# Patient Record
Sex: FEMALE | Race: WHITE | Employment: UNEMPLOYED | URBAN - METROPOLITAN AREA
[De-identification: names, ages, dates, MRNs, and addresses within clinical notes are randomized per-mention and may not be internally consistent; named-entity substitution may affect disease eponyms.]

---

## 2018-01-26 ENCOUNTER — OFFICE VISIT (OUTPATIENT)
Dept: FAMILY MEDICINE CLINIC | Age: 48
End: 2018-01-26
Payer: MEDICARE

## 2018-01-26 ENCOUNTER — HOSPITAL ENCOUNTER (OUTPATIENT)
Age: 48
Setting detail: SPECIMEN
Discharge: HOME | End: 2018-01-26
Payer: MEDICARE

## 2018-01-26 VITALS
BODY MASS INDEX: 30.98 KG/M2 | HEART RATE: 90 BPM | WEIGHT: 204.4 LBS | RESPIRATION RATE: 16 BRPM | DIASTOLIC BLOOD PRESSURE: 94 MMHG | SYSTOLIC BLOOD PRESSURE: 141 MMHG | OXYGEN SATURATION: 98 % | HEIGHT: 68 IN

## 2018-01-26 DIAGNOSIS — M54.42 CHRONIC LEFT-SIDED LOW BACK PAIN WITH LEFT-SIDED SCIATICA: ICD-10-CM

## 2018-01-26 DIAGNOSIS — G47.33 OSA (OBSTRUCTIVE SLEEP APNEA): ICD-10-CM

## 2018-01-26 DIAGNOSIS — Z23 NEEDS FLU SHOT: ICD-10-CM

## 2018-01-26 DIAGNOSIS — M79.7 FIBROMYALGIA: ICD-10-CM

## 2018-01-26 DIAGNOSIS — B49 FUNGAL INFECTION: ICD-10-CM

## 2018-01-26 DIAGNOSIS — G89.29 CHRONIC LEFT-SIDED LOW BACK PAIN WITH LEFT-SIDED SCIATICA: ICD-10-CM

## 2018-01-26 DIAGNOSIS — R35.0 INCREASED FREQUENCY OF URINATION: ICD-10-CM

## 2018-01-26 DIAGNOSIS — F32.A ANXIETY AND DEPRESSION: ICD-10-CM

## 2018-01-26 DIAGNOSIS — J45.20 MILD INTERMITTENT ASTHMA WITHOUT COMPLICATION: ICD-10-CM

## 2018-01-26 DIAGNOSIS — K58.0 IRRITABLE BOWEL SYNDROME WITH DIARRHEA: ICD-10-CM

## 2018-01-26 DIAGNOSIS — F41.9 ANXIETY AND DEPRESSION: ICD-10-CM

## 2018-01-26 DIAGNOSIS — K21.9 GASTROESOPHAGEAL REFLUX DISEASE WITHOUT ESOPHAGITIS: ICD-10-CM

## 2018-01-26 DIAGNOSIS — R41.3 MEMORY DEFICIT: ICD-10-CM

## 2018-01-26 DIAGNOSIS — I10 ESSENTIAL HYPERTENSION: ICD-10-CM

## 2018-01-26 DIAGNOSIS — Z76.89 ENCOUNTER TO ESTABLISH CARE: Primary | ICD-10-CM

## 2018-01-26 PROBLEM — K58.1 IRRITABLE BOWEL SYNDROME WITH CONSTIPATION: Status: RESOLVED | Noted: 2018-01-26 | Resolved: 2018-01-26

## 2018-01-26 PROBLEM — K58.1 IRRITABLE BOWEL SYNDROME WITH CONSTIPATION: Status: ACTIVE | Noted: 2018-01-26

## 2018-01-26 LAB
BILIRUBIN URINE: NEGATIVE
COLOR: YELLOW
COMMENT UA: NORMAL
GLUCOSE URINE: NEGATIVE
KETONES, URINE: NEGATIVE
LEUKOCYTE ESTERASE, URINE: NEGATIVE
NITRITE, URINE: NEGATIVE
PH UA: 7 (ref 5–8)
PROTEIN UA: NEGATIVE
SPECIFIC GRAVITY UA: 1.02 (ref 1–1.03)
TURBIDITY: CLEAR
URINE HGB: NEGATIVE
UROBILINOGEN, URINE: NORMAL

## 2018-01-26 PROCEDURE — G8427 DOCREV CUR MEDS BY ELIG CLIN: HCPCS | Performed by: FAMILY MEDICINE

## 2018-01-26 PROCEDURE — 1036F TOBACCO NON-USER: CPT | Performed by: FAMILY MEDICINE

## 2018-01-26 PROCEDURE — 99204 OFFICE O/P NEW MOD 45 MIN: CPT | Performed by: FAMILY MEDICINE

## 2018-01-26 PROCEDURE — G8484 FLU IMMUNIZE NO ADMIN: HCPCS | Performed by: FAMILY MEDICINE

## 2018-01-26 PROCEDURE — 90471 IMMUNIZATION ADMIN: CPT | Performed by: FAMILY MEDICINE

## 2018-01-26 PROCEDURE — G8417 CALC BMI ABV UP PARAM F/U: HCPCS | Performed by: FAMILY MEDICINE

## 2018-01-26 PROCEDURE — 90686 IIV4 VACC NO PRSV 0.5 ML IM: CPT | Performed by: FAMILY MEDICINE

## 2018-01-26 RX ORDER — LEVONORGESTREL AND ETHINYL ESTRADIOL 100-20(84)
1 KIT ORAL
COMMUNITY
Start: 2017-12-22 | End: 2019-02-25

## 2018-01-26 RX ORDER — CHLORTHALIDONE 25 MG/1
25 TABLET ORAL DAILY
Qty: 30 TABLET | Refills: 3 | Status: SHIPPED | OUTPATIENT
Start: 2018-01-26 | End: 2018-02-15 | Stop reason: ALTCHOICE

## 2018-01-26 RX ORDER — OMEPRAZOLE 20 MG/1
20 CAPSULE, DELAYED RELEASE ORAL DAILY
Qty: 90 CAPSULE | Refills: 0 | Status: SHIPPED | OUTPATIENT
Start: 2018-01-26 | End: 2019-02-25

## 2018-01-26 RX ORDER — BUSPIRONE HYDROCHLORIDE 5 MG/1
TABLET ORAL
Refills: 0 | COMMUNITY
Start: 2018-01-15 | End: 2018-08-01 | Stop reason: ALTCHOICE

## 2018-01-26 RX ORDER — LORAZEPAM 1 MG/1
1 TABLET ORAL 2 TIMES DAILY
Qty: 2 TABLET | Refills: 0 | Status: SHIPPED | OUTPATIENT
Start: 2018-01-26 | End: 2018-01-27

## 2018-01-26 RX ORDER — CLOTRIMAZOLE AND BETAMETHASONE DIPROPIONATE 10; .64 MG/G; MG/G
CREAM TOPICAL
Qty: 15 G | Refills: 0 | Status: SHIPPED | OUTPATIENT
Start: 2018-01-26 | End: 2018-08-01 | Stop reason: ALTCHOICE

## 2018-01-26 ASSESSMENT — PATIENT HEALTH QUESTIONNAIRE - PHQ9
2. FEELING DOWN, DEPRESSED OR HOPELESS: 0
SUM OF ALL RESPONSES TO PHQ9 QUESTIONS 1 & 2: 1
1. LITTLE INTEREST OR PLEASURE IN DOING THINGS: 1
SUM OF ALL RESPONSES TO PHQ QUESTIONS 1-9: 1

## 2018-01-26 ASSESSMENT — ENCOUNTER SYMPTOMS
DIARRHEA: 1
BACK PAIN: 1
SHORTNESS OF BREATH: 0
EYE PAIN: 0
BLOOD IN STOOL: 0

## 2018-01-26 NOTE — PROGRESS NOTES
months,  was seen urology in the past, denies hematuria, dysuria, urgency, or sensation of incomplete bladder emptying    Lasts labs May 2017 with rheum, CBC CMP wnl; lipids Aug 2016 wnl    Last pap - May 2017, normal  Contraception - OCP for hormonal migraines  Mammogram - 2018 with gyn, told it was normal    Patient Active Problem List   Diagnosis    Anxiety and depression    Fibromyalgia    Essential hypertension    Mild intermittent asthma without complication    Memory deficit    Gastroesophageal reflux disease without esophagitis    MARIBEL (obstructive sleep apnea)    Irritable bowel syndrome with diarrhea    Chronic left-sided low back pain with left-sided sciatica       Past Medical History:   Diagnosis Date    Anxiety     Depression     Fibromyalgia      Past Surgical History:   Procedure Laterality Date     SECTION      CHOLECYSTECTOMY      COCCYX REMOVAL       Family History   Problem Relation Age of Onset    High Blood Pressure Mother     Diabetes Father     Breast Cancer Maternal Grandmother      Social History   Substance Use Topics    Smoking status: Never Smoker    Smokeless tobacco: Never Used    Alcohol use Yes      Comment: occasional       Current Outpatient Prescriptions:     Levonorgest-Eth Estrad -Day 0.1-0.02 & 0.01 MG TABS, Take 1 tablet by mouth, Disp: , Rfl:     chlorthalidone (HYGROTON) 25 MG tablet, Take 1 tablet by mouth daily, Disp: 30 tablet, Rfl: 3    omeprazole (PRILOSEC) 20 MG delayed release capsule, Take 1 capsule by mouth daily, Disp: 90 capsule, Rfl: 0    clotrimazole-betamethasone (LOTRISONE) 1-0.05 % cream, Apply topically 2 times daily. , Disp: 15 g, Rfl: 0    LORazepam (ATIVAN) 1 MG tablet, Take 1 tablet by mouth 2 times daily for 1 day., Disp: 2 tablet, Rfl: 0    busPIRone (BUSPAR) 5 MG tablet, take 1 tablet by mouth twice a day, Disp: , Rfl: 0    Subjective:     Review of Systems   Constitutional: Negative for appetite change, diaphoresis, fever and unexpected weight change. HENT: Negative for ear pain. Eyes: Negative for pain. Respiratory: Negative for shortness of breath. Cardiovascular: Negative for chest pain and leg swelling. Gastrointestinal: Positive for diarrhea. Negative for blood in stool. Genitourinary: Positive for frequency. Negative for flank pain, hematuria, urgency and vaginal discharge. Musculoskeletal: Positive for back pain. Skin: Positive for rash. Psychiatric/Behavioral: Positive for dysphoric mood. Negative for suicidal ideas. The patient is nervous/anxious. Objective:     BP (!) 141/94   Pulse 90   Resp 16   Ht 5' 7.5\" (1.715 m)   Wt 204 lb 6.4 oz (92.7 kg)   SpO2 98%   BMI 31.54 kg/m²     Physical Exam   Constitutional: She is oriented to person, place, and time. She appears well-developed. No distress. HENT:   Head: Normocephalic. Mouth/Throat: Oropharynx is clear and moist.   Eyes: Conjunctivae and EOM are normal.   Neck: Normal range of motion. Neck supple. No thyromegaly present. Cardiovascular: Normal rate, regular rhythm and normal heart sounds. No murmur heard. Pulmonary/Chest: Effort normal and breath sounds normal. No respiratory distress. She has no wheezes. Abdominal: Soft. There is no rebound and no guarding. Musculoskeletal: She exhibits no edema or deformity. No pain on palpation over her spinous process, pain noted on palpation over the left paraspinal muscles, able to walk without a limp, sensation intact in lower extremities   Lymphadenopathy:     She has no cervical adenopathy. Neurological: She is alert and oriented to person, place, and time. Skin: Skin is warm. No rash noted. She is not diaphoretic. 2 cm circular pink rough rash consistent with tinea   Psychiatric: She has a normal mood and affect. Her behavior is normal. Judgment and thought content normal.       Assessment & Plan:      1.  Encounter to establish care  - Vitamin B12; Future  - Vitamin D 25 Hydroxy; Future  - TSH with Reflex; Future  - Lipid Panel; Future  - Comprehensive Metabolic Panel; Future  - CBC Auto Differential; Future    2. Anxiety and depression  Continue follow-up at [facility] behavioral.    3. Fibromyalgia  The patient wishes to go back to see Dr. Staci Zimmer who she had seen in the past.  - [facility] Zohra Genao MD    4. Essential hypertension  Rx given for chlorthalidone. She'll follow-up in one month for blood pressure check and she'll maintain a blood pressure log at home  - chlorthalidone (HYGROTON) 25 MG tablet; Take 1 tablet by mouth daily  Dispense: 30 tablet; Refill: 3    5. Mild intermittent asthma without complication    6. Memory deficit  Referral made for neuropsychological assessment. - Amb External Referral To Neuropsychology    7. Gastroesophageal reflux disease without esophagitis  - omeprazole (PRILOSEC) 20 MG delayed release capsule; Take 1 capsule by mouth daily  Dispense: 90 capsule; Refill: 0    8. Increased frequency of urination  Urinalysis and bladder ultrasound with postvoid residual ordered. The patient has a urologist who she is seen in the past and she wishes to go back to see them. - US RENAL COMPLETE; Future  - UA W/REFLEX CULTURE    9. MARIBEL (obstructive sleep apnea)  Recommend that the patient uses her CPAP machine given the risks of untreated sleep apnea. States that he order for CPAP titration given that her machine several years old. - Sleep Study with PAP Titration; Future    10. Irritable bowel syndrome with diarrhea    11. Chronic left-sided low back pain with left-sided sciatica  Recommended physical therapy. MRI lumbar spine ordered. Patient needs Ativan to take prior to the MRI given that she is claustrophobic.  - MRI LUMBAR SPINE [address]; Future  - LORazepam (ATIVAN) 1 MG tablet; Take 1 tablet by mouth 2 times daily for 1 day. Dispense: 2 tablet; Refill: 0    12.  Fungal infection  Spot her calf seems related to tinea, Rx given for Lotrisone  - clotrimazole-betamethasone (LOTRISONE) 1-0.05 % cream; Apply topically 2 times daily. Dispense: 15 g; Refill: 0    13.  Needs flu shot  - INFLUENZA, QUADV, 3 YRS AND OLDER, IM, PF, PREFILL SYR OR SDV, 0.5ML (FLUZONE QUADV, PF)    Follow-up in one month for blood pressure check    Electronically signed by Severo Sherman MD on 1/26/2018 at 12:28 PM

## 2018-01-31 ENCOUNTER — TELEPHONE (OUTPATIENT)
Dept: FAMILY MEDICINE CLINIC | Age: 48
End: 2018-01-31

## 2018-01-31 DIAGNOSIS — R06.09 DYSPNEA ON EXERTION: Primary | ICD-10-CM

## 2018-02-05 ENCOUNTER — HOSPITAL ENCOUNTER (OUTPATIENT)
Age: 48
Setting detail: SPECIMEN
Discharge: HOME | End: 2018-02-05
Payer: MEDICARE

## 2018-02-05 DIAGNOSIS — Z76.89 ENCOUNTER TO ESTABLISH CARE: ICD-10-CM

## 2018-02-05 LAB
ABSOLUTE EOS #: 0.15 K/UL (ref 0–0.44)
ABSOLUTE IMMATURE GRANULOCYTE: <0.03 K/UL (ref 0–0.3)
ABSOLUTE LYMPH #: 2.28 K/UL (ref 1.1–3.7)
ABSOLUTE MONO #: 0.49 K/UL (ref 0.1–1.2)
ALBUMIN SERPL-MCNC: 4.2 G/DL (ref 3.5–5.2)
ALBUMIN/GLOBULIN RATIO: 1.2 (ref 1–2.5)
ALP BLD-CCNC: 59 U/L (ref 35–104)
ALT SERPL-CCNC: 24 U/L (ref 5–33)
ANION GAP SERPL CALCULATED.3IONS-SCNC: 18 MMOL/L (ref 9–17)
AST SERPL-CCNC: 29 U/L
BASOPHILS # BLD: 1 % (ref 0–2)
BASOPHILS ABSOLUTE: 0.06 K/UL (ref 0–0.2)
BILIRUB SERPL-MCNC: 0.59 MG/DL (ref 0.3–1.2)
BUN BLDV-MCNC: 17 MG/DL (ref 6–20)
BUN/CREAT BLD: ABNORMAL (ref 9–20)
CALCIUM SERPL-MCNC: 9.5 MG/DL (ref 8.6–10.4)
CHLORIDE BLD-SCNC: 94 MMOL/L (ref 98–107)
CHOLESTEROL/HDL RATIO: 3.6
CHOLESTEROL: 179 MG/DL
CO2: 28 MMOL/L (ref 20–31)
CREAT SERPL-MCNC: 0.84 MG/DL (ref 0.5–0.9)
DIFFERENTIAL TYPE: ABNORMAL
EOSINOPHILS RELATIVE PERCENT: 2 % (ref 1–4)
GFR AFRICAN AMERICAN: >60 ML/MIN
GFR NON-AFRICAN AMERICAN: >60 ML/MIN
GFR SERPL CREATININE-BSD FRML MDRD: ABNORMAL ML/MIN/{1.73_M2}
GFR SERPL CREATININE-BSD FRML MDRD: ABNORMAL ML/MIN/{1.73_M2}
GLUCOSE BLD-MCNC: 92 MG/DL (ref 70–99)
HCT VFR BLD CALC: 46.1 % (ref 36.3–47.1)
HDLC SERPL-MCNC: 50 MG/DL
HEMOGLOBIN: 15.6 G/DL (ref 11.9–15.1)
IMMATURE GRANULOCYTES: 0 %
LDL CHOLESTEROL: 104 MG/DL (ref 0–130)
LYMPHOCYTES # BLD: 29 % (ref 24–43)
MCH RBC QN AUTO: 28.8 PG (ref 25.2–33.5)
MCHC RBC AUTO-ENTMCNC: 33.8 G/DL (ref 28.4–34.8)
MCV RBC AUTO: 85.2 FL (ref 82.6–102.9)
MONOCYTES # BLD: 6 % (ref 3–12)
NRBC AUTOMATED: 0 PER 100 WBC
PDW BLD-RTO: 12 % (ref 11.8–14.4)
PLATELET # BLD: ABNORMAL K/UL (ref 138–453)
PLATELET ESTIMATE: ABNORMAL
PLATELET, FLUORESCENCE: 209 K/UL (ref 138–453)
PLATELET, IMMATURE FRACTION: 8.8 % (ref 1.1–10.3)
PMV BLD AUTO: ABNORMAL FL (ref 8.1–13.5)
POTASSIUM SERPL-SCNC: 3.2 MMOL/L (ref 3.7–5.3)
RBC # BLD: 5.41 M/UL (ref 3.95–5.11)
RBC # BLD: ABNORMAL 10*6/UL
SEG NEUTROPHILS: 62 % (ref 36–65)
SEGMENTED NEUTROPHILS ABSOLUTE COUNT: 4.97 K/UL (ref 1.5–8.1)
SODIUM BLD-SCNC: 140 MMOL/L (ref 135–144)
TOTAL PROTEIN: 7.7 G/DL (ref 6.4–8.3)
TRIGL SERPL-MCNC: 123 MG/DL
TSH SERPL DL<=0.05 MIU/L-ACNC: 3.5 MIU/L (ref 0.3–5)
VITAMIN B-12: 399 PG/ML (ref 232–1245)
VITAMIN D 25-HYDROXY: 33.6 NG/ML (ref 30–100)
VLDLC SERPL CALC-MCNC: NORMAL MG/DL (ref 1–30)
WBC # BLD: 8 K/UL (ref 3.5–11.3)
WBC # BLD: ABNORMAL 10*3/UL

## 2018-02-07 DIAGNOSIS — E87.6 LOW SERUM POTASSIUM: Primary | ICD-10-CM

## 2018-02-07 DIAGNOSIS — D58.2 ELEVATED HEMOGLOBIN (HCC): ICD-10-CM

## 2018-02-07 RX ORDER — POTASSIUM CHLORIDE 750 MG/1
10 TABLET, EXTENDED RELEASE ORAL DAILY
Qty: 3 TABLET | Refills: 0 | Status: SHIPPED | OUTPATIENT
Start: 2018-02-07 | End: 2018-02-15 | Stop reason: SDUPTHER

## 2018-02-12 ENCOUNTER — HOSPITAL ENCOUNTER (OUTPATIENT)
Dept: ULTRASOUND IMAGING | Age: 48
Discharge: HOME | End: 2018-02-14
Payer: MEDICARE

## 2018-02-12 ENCOUNTER — HOSPITAL ENCOUNTER (OUTPATIENT)
Dept: MRI IMAGING | Age: 48
Discharge: HOME | End: 2018-02-14
Payer: MEDICARE

## 2018-02-12 DIAGNOSIS — M54.42 CHRONIC LEFT-SIDED LOW BACK PAIN WITH LEFT-SIDED SCIATICA: ICD-10-CM

## 2018-02-12 DIAGNOSIS — R35.0 INCREASED FREQUENCY OF URINATION: ICD-10-CM

## 2018-02-12 DIAGNOSIS — G89.29 CHRONIC LEFT-SIDED LOW BACK PAIN WITH LEFT-SIDED SCIATICA: ICD-10-CM

## 2018-02-12 PROCEDURE — 72148 MRI LUMBAR SPINE W/O DYE: CPT

## 2018-02-12 PROCEDURE — 76770 US EXAM ABDO BACK WALL COMP: CPT

## 2018-02-13 ENCOUNTER — HOSPITAL ENCOUNTER (OUTPATIENT)
Age: 48
Discharge: HOME | End: 2018-02-13
Payer: MEDICARE

## 2018-02-13 ENCOUNTER — HOSPITAL ENCOUNTER (OUTPATIENT)
Age: 48
Discharge: HOME | End: 2018-02-15
Payer: MEDICARE

## 2018-02-13 ENCOUNTER — HOSPITAL ENCOUNTER (OUTPATIENT)
Dept: GENERAL RADIOLOGY | Age: 48
Discharge: HOME | End: 2018-02-15
Payer: MEDICARE

## 2018-02-13 ENCOUNTER — HOSPITAL ENCOUNTER (OUTPATIENT)
Dept: NON INVASIVE DIAGNOSTICS | Age: 48
Discharge: HOME | End: 2018-02-13
Payer: MEDICARE

## 2018-02-13 VITALS
HEART RATE: 90 BPM | WEIGHT: 204 LBS | SYSTOLIC BLOOD PRESSURE: 146 MMHG | HEIGHT: 68 IN | RESPIRATION RATE: 16 BRPM | DIASTOLIC BLOOD PRESSURE: 92 MMHG | BODY MASS INDEX: 30.92 KG/M2

## 2018-02-13 DIAGNOSIS — R06.09 DYSPNEA ON EXERTION: ICD-10-CM

## 2018-02-13 LAB
EKG ATRIAL RATE: 80 BPM
EKG P AXIS: 49 DEGREES
EKG P-R INTERVAL: 142 MS
EKG Q-T INTERVAL: 384 MS
EKG QRS DURATION: 86 MS
EKG QTC CALCULATION (BAZETT): 442 MS
EKG R AXIS: 60 DEGREES
EKG T AXIS: 52 DEGREES
EKG VENTRICULAR RATE: 80 BPM

## 2018-02-13 PROCEDURE — 93005 ELECTROCARDIOGRAM TRACING: CPT

## 2018-02-13 PROCEDURE — 71046 X-RAY EXAM CHEST 2 VIEWS: CPT

## 2018-02-13 PROCEDURE — 93017 CV STRESS TEST TRACING ONLY: CPT

## 2018-02-15 ENCOUNTER — HOSPITAL ENCOUNTER (OUTPATIENT)
Age: 48
Setting detail: SPECIMEN
Discharge: HOME | End: 2018-02-15
Payer: MEDICARE

## 2018-02-15 ENCOUNTER — TELEPHONE (OUTPATIENT)
Dept: FAMILY MEDICINE CLINIC | Age: 48
End: 2018-02-15

## 2018-02-15 DIAGNOSIS — E87.6 LOW SERUM POTASSIUM: Primary | ICD-10-CM

## 2018-02-15 DIAGNOSIS — D58.2 ELEVATED HEMOGLOBIN (HCC): ICD-10-CM

## 2018-02-15 DIAGNOSIS — I10 ESSENTIAL HYPERTENSION: ICD-10-CM

## 2018-02-15 DIAGNOSIS — E87.6 LOW BLOOD POTASSIUM: Primary | ICD-10-CM

## 2018-02-15 DIAGNOSIS — E87.6 LOW SERUM POTASSIUM: ICD-10-CM

## 2018-02-15 LAB
ANION GAP SERPL CALCULATED.3IONS-SCNC: 16 MMOL/L (ref 9–17)
CHLORIDE BLD-SCNC: 99 MMOL/L (ref 98–107)
CO2: 25 MMOL/L (ref 20–31)
HEMOGLOBIN: 15.1 G/DL (ref 11.9–15.1)
POTASSIUM SERPL-SCNC: 2.9 MMOL/L (ref 3.7–5.3)
SODIUM BLD-SCNC: 140 MMOL/L (ref 135–144)

## 2018-02-15 RX ORDER — SPIRONOLACTONE 50 MG/1
50 TABLET, FILM COATED ORAL DAILY
Qty: 30 TABLET | Refills: 3 | Status: SHIPPED | OUTPATIENT
Start: 2018-02-15 | End: 2018-06-13 | Stop reason: SDUPTHER

## 2018-02-15 RX ORDER — POTASSIUM CHLORIDE 20 MEQ/1
TABLET, EXTENDED RELEASE ORAL
Qty: 4 TABLET | Refills: 0 | Status: SHIPPED | OUTPATIENT
Start: 2018-02-15 | End: 2018-02-26 | Stop reason: ALTCHOICE

## 2018-02-16 ENCOUNTER — HOSPITAL ENCOUNTER (OUTPATIENT)
Age: 48
Setting detail: SPECIMEN
Discharge: HOME | End: 2018-02-16
Payer: MEDICARE

## 2018-02-16 DIAGNOSIS — E87.6 HYPOKALEMIA: Primary | ICD-10-CM

## 2018-02-16 DIAGNOSIS — E87.6 LOW SERUM POTASSIUM: ICD-10-CM

## 2018-02-16 LAB
MAGNESIUM: 1.8 MG/DL (ref 1.6–2.6)
POTASSIUM SERPL-SCNC: 3.2 MMOL/L (ref 3.7–5.3)

## 2018-02-20 ENCOUNTER — HOSPITAL ENCOUNTER (OUTPATIENT)
Dept: PHYSICAL THERAPY | Facility: CLINIC | Age: 48
Setting detail: THERAPIES SERIES
Discharge: HOME | End: 2018-02-20
Payer: MEDICARE

## 2018-02-20 PROCEDURE — 97110 THERAPEUTIC EXERCISES: CPT

## 2018-02-20 PROCEDURE — 97162 PT EVAL MOD COMPLEX 30 MIN: CPT

## 2018-02-20 NOTE — CONSULTS
soreness would last for days. Due to this, pt has not remained physically active.      Radicular pain: heel pain   Numbness/tingling groin: neg  Bowel/bladder incontinence:  Leg weakness: pos  Falls: did fall down stairs secondary to passing out (pt believes was due to her medication      Yes  No Comments   Fatigue [x] [] Does not sleep         PMHx: [] Unremarkable [] Diabetes [x] HTN  [] Pacemaker   [] MI/Heart Problems [] Cancer [] Arthritis [x] Other: hx of kidney stones with lithotripsy (2016, 2017) with stents placed in 2017;  14 years ago, gall bladder removal              [x]Refer to full medical chart  In EPIC   Tests: [] X-Ray: [x]MRI:  [] Other:    Medications: [x] Refer to full medical record [] None [] Other:  Allergies:      [x] Refer to full medical record [] None [] Other:    Function:  Hand Dominance  [x] Right  [] Left  Working:  [] Normal Duty  [] Light Duty  [] Off D/T Condition  [] Retired  [x] Not Employed                  []  Disability  [] Other:           Return to work:   Job/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      Pain:  [] Yes  [] No Location: low back pain Pain Rating: (0-10 scale) 8-9/10 (not as bad as it can be)  Pain altered Tx:  [] Yes  [] No  Action:  Symptoms:  [] Improving [] Worsening [x] Same- within the last year  Better:  [] AM    [] PM    [] Sit    [] Rise/Sit    []Stand    [] Walk    [] Lying    [] Other:  Worse: [x] AM    [] PM    [x]Sit    [] Rise/Sit    [x]Stand    [x] Walk    [x] Lying    [x] Bend                             [x] Valsalva- straining on the toilet, she has an increase in L glute pain    [x]Other: unable to remain in a position for any length of time; notes difficulty walking in the morning  Sleep: [] OK    [x] Disturbed    Goals: decrease pain    Objective:    132/92  OBSERVATION No Deficit Deficit Not Tested Comments   Posture       Forward Head [] [x] [] Significant forward head posture and slumped posture when sitting unsupported   Rounded Shoulders [] [x] []    Kyphosis [x] [] []    Lordosis [x] [] []    Lateral Shift [] [] [x]    Scoliosis [] [] [x]    Iliac Crest [] [x] []    PSIS [] [x] []    ASIS [] [x] []    Genu Valgus [] [] [x]    Genu Varus [] [] [x]    Genu Recurvatum [] [] [x]    Pronation [x] [] []    Supination [x] [] []    Leg Length Discrp [x] [] []    Slumped Sitting [] [x] []    Palpation [] [x] [] Diffuse tenderness over bony landmarks, L QL tenderness  Increased tenderness with abdominal palpation over kidneys- neg Avilas test   Sensation [x] [] []    Edema [x] [] []    Neurological [] [x] [] 1 BEAT CLONUS B  Brisk reflexes- achilles, B  Patellar, brisk, symmetrical  Negative Lhermittes    Gait  x  Analysis: unremarkable  Walks on lateral border of feet   Balance   x L:  R:   Standing SI alignment   R iliac crest superior to L   R ASIS superior to L   R PSIS superior to L     STRENGTH  ROM    Left Right Lumbar    L1-2 Hip Flex 4- 4 Flexion Mid shin   Hip Abd   Extension WFL (prefers ext)   L3-4 Knee Ext 4 4+ Rotation L: symmetrical  R: symmetrical   L4 Ankle DF 5 5 Sidebend L: R sided back pain R: L sided pain with pain down the anterior aspect of thigh   L5 EHL   LE    S1 Plant.  Flex       Abdominals Deficient TrA activation     Erector Spinae       Hip Ext        Hip IR 4+ 5      Hip ER 4+ 5      HS 4+ 4+                          TESTS (+/-) LEFT RIGHT Not Tested   SLR- passive [] sit [x]supine Pos (stretch calf and post leg) Pos (stretch post leg)  []   Hamstring (SLR)   []   SKTC Pain in hip neg []   DKTC Neg neg []   Slump/Dural Pos: pinching/discomfort) Neg (isolated stretch) []   SI JT   []   FINN Pos (stretch in hip) Neg  []   FADIR Pos for tightness/pain in hip Pos for tightness []   Scour  Pos neg []   Lying with legs extended- feels back is arching    FUNCTION Normal Difficult Unable   Sitting [] [x] []   Standing [] [x] []   Ambulation [] [x] []   Groom/Dress [] [x] [] demonstrate muscle activation when sitting, standing, walking, and completing ADLs. 4. ? Function: Pt will score less than or equal to 73% on the Oswestry in order to demonstrate an improvement in function. 5. Independent with Home Exercise Programs in order to promote a healthy lifestyle  6. Demonstrate Knowledge of fall prevention  LTG: (to be met in 12 treatments)  1. ? Pain: Pt will report less than or equal to 5-6/10 low back pain with sitting unsupported for 5 minutes and standing at the kitchen sink washing dishes or folding clothes for 10 minutes in order to improve tolerance to completion of house maintenance and ADLs. 2. ? ROM: Pt will be able to perform lumbar AROM in all planes without an increase in low back pain and without reports of LE pain in order to promote improved tolerance and confidence with movement when completing ADLs and promoting ability to remain physically active. 3. ? Strength: Pt will be able to complete 10 minutes of standing core and LE strengthening exercises with demonstrating an upright posture in order to improve pts standing tolerance to promote ease with completion of household chores. 4. ? Strength: Pt will improve BLE strength to greater than or equal to 4+/5 LE and 3/5 abdominal strength in order to improve overall core strength to promote tolerance to standing, walking, and completing ADLs. 5. ? Function: Pt will score less than or equal to 68% on the Oswestry in order to demonstrate an improvement in function.       Patient goals: decrease pain    Rehab Potential:  [] Good  [x] Fair  [] Poor   Suggested Professional Referral:  [] No  [x] Yes: pain management (psychology)  Barriers to Goal Achievement[de-identified]  [] No  [x] Yes: chronicity of pain, multiple locations of pain, hx of anxiety/depression and fibromyalgia and kidney stones  Domestic Concerns:  [x] No  [] Yes:    Pt. Education:  [x] Plans/Goals, Risks/Benefits discussed  [x] Home exercise program  Method of Signature:________________________________Date:__________________  By signing above or cosigning this note, I have reviewed this plan of care and certify a need for medically necessary rehabilitation services.      *PLEASE SIGN ABOVE AND FAX BACK ALL PAGES*

## 2018-02-22 ENCOUNTER — HOSPITAL ENCOUNTER (OUTPATIENT)
Age: 48
Setting detail: SPECIMEN
Discharge: HOME | End: 2018-02-22
Payer: MEDICARE

## 2018-02-22 ENCOUNTER — HOSPITAL ENCOUNTER (OUTPATIENT)
Dept: PHYSICAL THERAPY | Facility: CLINIC | Age: 48
Setting detail: THERAPIES SERIES
Discharge: HOME | End: 2018-02-22
Payer: MEDICARE

## 2018-02-22 DIAGNOSIS — E87.6 HYPOKALEMIA: ICD-10-CM

## 2018-02-22 LAB
ANION GAP SERPL CALCULATED.3IONS-SCNC: 13 MMOL/L (ref 9–17)
CHLORIDE BLD-SCNC: 105 MMOL/L (ref 98–107)
CO2: 22 MMOL/L (ref 20–31)
POTASSIUM SERPL-SCNC: 3.8 MMOL/L (ref 3.7–5.3)
SODIUM BLD-SCNC: 140 MMOL/L (ref 135–144)

## 2018-02-22 PROCEDURE — 97110 THERAPEUTIC EXERCISES: CPT

## 2018-02-22 NOTE — FLOWSHEET NOTE
[] [facility]       Outpatient Physical        Therapy       [address]       Phone: [phone]       Fax: [phone] [] [facility] at [address] [facility]       Phone: [phone]       Fax: [phone] [] [facility]. [address] [place] [address]  [address]   Phone: [phone]   Fax:  [phone]     Physical Therapy Daily Treatment Note    Date:  2018  Patient Name:  Haleigh Hickman    :  1970  MRN: [id number]  Physician: Alfonzo Maldonado MD                                    Insurance: [place] [employer]  Medical Diagnosis: chronic L sided back, L leg pain                       Rehab Codes: M54.5, M54.6, M79.1, R26.2NEC, R29.3, M62.81  Onset Date: 18               Next 's appt. : 18  Visit# / total visits:   Cancels/No Shows: 0    Subjective:    Pain:  [] Yes  [] No Location: L sided low back N/A Pain Rating: (0-10 scale) 7/10  Pain altered Tx:  [] No  [] Yes  Action:  Comments: Pt notes she tried the tennis ball to the L side of her low back and she can hardly touch that area now. Pt reports they added zoloft to her medication list.    Objective:  Modalities:   Precautions:  Exercises:  Exercise Reps/ Time Weight/ Level Comments   Nu-step/Sci-Fit 6' L2     LTR 10x ea     TrA activation 10x5\"     Alt arm raises 10x   maintaining TrA activation   Unilateral leg marches 10x  Noted posterior RLE pain- felt RLE pain more so when lifting the L leg         Prone lying x       Prone on elbows 3'   shoulders started to bother her   glute sets 10x5\"             Sitting      Scapular retractions 10x5\"     pec stretch- doorway 3x20\"     Pt education     Pt educated on the relationship of chronic pain and the effect on the nervous system. Pt advised to talk to psychologist about pain. Pt also advised to not complete tennis ball manual anymore secondary to increased sensitivity to pressure.  Pt ADLs and housework. 3. ? Strength: Pt will be able to perform 5 TrA isometric holds for 5 seconds without cueing from therapist in order to demonstrate muscle activation when sitting, standing, walking, and completing ADLs. 4. ? Function: Pt will score less than or equal to 73% on the Oswestry in order to demonstrate an improvement in function. 5. Independent with Home Exercise Programs in order to promote a healthy lifestyle  6. Demonstrate Knowledge of fall prevention  LTG: (to be met in 12 treatments)  1. ? Pain: Pt will report less than or equal to 5-6/10 low back pain with sitting unsupported for 5 minutes and standing at the kitchen sink washing dishes or folding clothes for 10 minutes in order to improve tolerance to completion of house maintenance and ADLs. 2. ? ROM: Pt will be able to perform lumbar AROM in all planes without an increase in low back pain and without reports of LE pain in order to promote improved tolerance and confidence with movement when completing ADLs and promoting ability to remain physically active. 3. ? Strength: Pt will be able to complete 10 minutes of standing core and LE strengthening exercises with demonstrating an upright posture in order to improve pts standing tolerance to promote ease with completion of household chores. 4. ? Strength: Pt will improve BLE strength to greater than or equal to 4+/5 LE and 3/5 abdominal strength in order to improve overall core strength to promote tolerance to standing, walking, and completing ADLs. 5. ? Function: Pt will score less than or equal to 68% on the Oswestry in order to demonstrate an improvement in function.        Patient goals: decrease pain    Pt. Education:  [x] Yes  [] No  [] Reviewed Prior HEP/Ed  Method of Education: [x] Verbal  [] Demo  [x] Written  Comprehension of Education:  [x] Verbalizes understanding. [] Demonstrates understanding. [] Needs review.   [x] Demonstrates/verbalizes HEP/Ed previously

## 2018-02-26 ENCOUNTER — HOSPITAL ENCOUNTER (OUTPATIENT)
Age: 48
Setting detail: SPECIMEN
Discharge: HOME | End: 2018-02-26
Payer: MEDICARE

## 2018-02-26 ENCOUNTER — OFFICE VISIT (OUTPATIENT)
Dept: FAMILY MEDICINE CLINIC | Age: 48
End: 2018-02-26
Payer: MEDICARE

## 2018-02-26 VITALS
DIASTOLIC BLOOD PRESSURE: 86 MMHG | SYSTOLIC BLOOD PRESSURE: 134 MMHG | OXYGEN SATURATION: 98 % | WEIGHT: 204 LBS | HEIGHT: 68 IN | BODY MASS INDEX: 30.92 KG/M2 | HEART RATE: 92 BPM | RESPIRATION RATE: 16 BRPM

## 2018-02-26 DIAGNOSIS — I10 ESSENTIAL HYPERTENSION: Primary | ICD-10-CM

## 2018-02-26 DIAGNOSIS — R07.9 CHEST PAIN, UNSPECIFIED TYPE: ICD-10-CM

## 2018-02-26 DIAGNOSIS — R11.0 NAUSEA: ICD-10-CM

## 2018-02-26 DIAGNOSIS — F32.A ANXIETY AND DEPRESSION: ICD-10-CM

## 2018-02-26 DIAGNOSIS — F41.9 ANXIETY AND DEPRESSION: ICD-10-CM

## 2018-02-26 DIAGNOSIS — I10 ESSENTIAL HYPERTENSION: ICD-10-CM

## 2018-02-26 LAB
ANION GAP SERPL CALCULATED.3IONS-SCNC: 21 MMOL/L (ref 9–17)
CHLORIDE BLD-SCNC: 106 MMOL/L (ref 98–107)
CO2: 18 MMOL/L (ref 20–31)
CREAT SERPL-MCNC: 0.78 MG/DL (ref 0.5–0.9)
GFR AFRICAN AMERICAN: >60 ML/MIN
GFR NON-AFRICAN AMERICAN: >60 ML/MIN
GFR SERPL CREATININE-BSD FRML MDRD: NORMAL ML/MIN/{1.73_M2}
GFR SERPL CREATININE-BSD FRML MDRD: NORMAL ML/MIN/{1.73_M2}
POTASSIUM SERPL-SCNC: 4.1 MMOL/L (ref 3.7–5.3)
SODIUM BLD-SCNC: 145 MMOL/L (ref 135–144)

## 2018-02-26 PROCEDURE — 99214 OFFICE O/P EST MOD 30 MIN: CPT | Performed by: FAMILY MEDICINE

## 2018-02-26 PROCEDURE — G8427 DOCREV CUR MEDS BY ELIG CLIN: HCPCS | Performed by: FAMILY MEDICINE

## 2018-02-26 PROCEDURE — G8417 CALC BMI ABV UP PARAM F/U: HCPCS | Performed by: FAMILY MEDICINE

## 2018-02-26 PROCEDURE — G8484 FLU IMMUNIZE NO ADMIN: HCPCS | Performed by: FAMILY MEDICINE

## 2018-02-26 PROCEDURE — 1036F TOBACCO NON-USER: CPT | Performed by: FAMILY MEDICINE

## 2018-02-26 RX ORDER — ONDANSETRON 4 MG/1
4 TABLET, FILM COATED ORAL DAILY PRN
Qty: 30 TABLET | Refills: 0 | Status: SHIPPED | OUTPATIENT
Start: 2018-02-26

## 2018-02-26 RX ORDER — SERTRALINE HYDROCHLORIDE 25 MG/1
12.5 TABLET, FILM COATED ORAL DAILY
Qty: 1 TABLET | Refills: 0
Start: 2018-02-26 | End: 2019-02-25

## 2018-02-26 ASSESSMENT — ENCOUNTER SYMPTOMS
NAUSEA: 1
EYE PAIN: 0
SHORTNESS OF BREATH: 1
BLOOD IN STOOL: 0
VOMITING: 0

## 2018-02-27 ENCOUNTER — HOSPITAL ENCOUNTER (OUTPATIENT)
Dept: PHYSICAL THERAPY | Facility: CLINIC | Age: 48
Setting detail: THERAPIES SERIES
Discharge: HOME | End: 2018-02-27
Payer: MEDICARE

## 2018-03-01 ENCOUNTER — HOSPITAL ENCOUNTER (OUTPATIENT)
Dept: PHYSICAL THERAPY | Facility: CLINIC | Age: 48
Setting detail: THERAPIES SERIES
Discharge: HOME | End: 2018-03-01
Payer: MEDICARE

## 2018-03-01 ENCOUNTER — TELEPHONE (OUTPATIENT)
Dept: FAMILY MEDICINE CLINIC | Age: 48
End: 2018-03-01

## 2018-03-01 NOTE — TELEPHONE ENCOUNTER
Pt went to Physical Therapy apt and had tightness in chest.  Took BP and it was 140/100 manual, electronic was 146/103. P: 98.   Pt states she believes it is due to anxiety. Says there is a carleen at PT that freaks her out, and she thought that he was going to work with him today. They told her to go to ER. Chest tightness is better now. Is sitting in parking lot at the PT location.

## 2018-03-01 NOTE — FLOWSHEET NOTE
function. 5. Independent with Home Exercise Programs in order to promote a healthy lifestyle  6. Demonstrate Knowledge of fall prevention  LTG: (to be met in 12 treatments)  1. ? Pain: Pt will report less than or equal to 5-6/10 low back pain with sitting unsupported for 5 minutes and standing at the kitchen sink washing dishes or folding clothes for 10 minutes in order to improve tolerance to completion of house maintenance and ADLs. 2. ? ROM: Pt will be able to perform lumbar AROM in all planes without an increase in low back pain and without reports of LE pain in order to promote improved tolerance and confidence with movement when completing ADLs and promoting ability to remain physically active. 3. ? Strength: Pt will be able to complete 10 minutes of standing core and LE strengthening exercises with demonstrating an upright posture in order to improve pts standing tolerance to promote ease with completion of household chores. 4. ? Strength: Pt will improve BLE strength to greater than or equal to 4+/5 LE and 3/5 abdominal strength in order to improve overall core strength to promote tolerance to standing, walking, and completing ADLs. 5. ? Function: Pt will score less than or equal to 68% on the Oswestry in order to demonstrate an improvement in function.        Patient goals: decrease pain    Pt. Education:  [x] Yes  [] No  [] Reviewed Prior HEP/Ed  Method of Education: [x] Verbal  [] Demo  [x] Written  Comprehension of Education:  [x] Verbalizes understanding. [] Demonstrates understanding. [] Needs review. [x] Demonstrates/verbalizes HEP/Ed previously given. Plan: [x] Continue per plan of care.    [] Other:      Time In: 9:00 a            Time Out: 09:15 a    Electronically signed by:  Laura Conner PTA

## 2018-03-02 ENCOUNTER — TELEPHONE (OUTPATIENT)
Dept: FAMILY MEDICINE CLINIC | Age: 48
End: 2018-03-02

## 2018-03-05 ENCOUNTER — HOSPITAL ENCOUNTER (OUTPATIENT)
Dept: PHYSICAL THERAPY | Facility: CLINIC | Age: 48
Setting detail: THERAPIES SERIES
Discharge: HOME | End: 2018-03-05
Payer: MEDICARE

## 2018-03-05 PROCEDURE — 97110 THERAPEUTIC EXERCISES: CPT

## 2018-03-05 NOTE — FLOWSHEET NOTE
doorway 3x20\"     Pt education     Pt educated on the relationship of chronic pain and the effect on the nervous system. Pt advised to talk to psychologist about pain. Pt also advised to not complete tennis ball manual anymore secondary to increased sensitivity to pressure. Pt advised when stretching, the pull she feels in the lateral side of her back is of a muscular nature vs kidney. Other:     Specific Instructions for next treatment:    Treatment Charges: Mins Units   [x]  Modalities:HP 15 0   [x]  Ther Exercise 25 2   []  Manual Therapy     []  Ther Activities     []  Aquatics     []  Vasocompression     []  Other     Total Treatment time 25 2       Assessment: [x] Progressing toward goals. Pt able to tolerate all exercises today. Pt noted fatigue with all exercises. Decreased prone on elbows time to help with neck and shoulder pain. Also trialed heat to back and neck at end of treatment and patient noted some slight relief. [] No change. [] Other:    STG: (to be met in 6 treatments)  1. ? Pain: Pt will report less than or equal to 7-8/10 low back pain with sitting unsupported for 5 minutes and standing at the kitchen sink washing dishes or folding clothes for 10 minutes in order to improve tolerance to completion of house maintenance and ADLs. 2. ? ROM: Pt will be able to perform L side bending to her L knee with 0/10 complaints of anterior thigh pain in order to demonstrate improved flexibility and stabilization of herniated discs to promote improved tolerance to movement when completing ADLs and housework. 3. ? Strength: Pt will be able to perform 5 TrA isometric holds for 5 seconds without cueing from therapist in order to demonstrate muscle activation when sitting, standing, walking, and completing ADLs. 4. ? Function: Pt will score less than or equal to 73% on the Oswestry in order to demonstrate an improvement in function.   5. Independent with Home Exercise Programs in order to promote a

## 2018-03-08 ENCOUNTER — HOSPITAL ENCOUNTER (OUTPATIENT)
Dept: PHYSICAL THERAPY | Facility: CLINIC | Age: 48
Setting detail: THERAPIES SERIES
Discharge: HOME | End: 2018-03-08
Payer: MEDICARE

## 2018-03-08 PROCEDURE — 97140 MANUAL THERAPY 1/> REGIONS: CPT

## 2018-03-08 NOTE — FLOWSHEET NOTE
therapy: 24'  R sidelying: Foam rolling to L ITB and hip abductor musculature   Prone lying: foam rolling to L glute; trigger point/sustained pressure over the L piriformis; trigger point to L sided paraspinals (30-60 second sustained holds); PA glides grade I to lumbar spine; PA glides to thoracic spine with end range thrust       Specific Instructions for next treatment:    Treatment Charges: Mins Units   []  Modalities:HP 15 0   [x]  Ther Exercise 25 2   []  Manual Therapy     []  Ther Activities     []  Aquatics     []  Vasocompression     []  Other     Total Treatment time 25 2       Assessment: [x] Progressing toward goals. Pt with good tolerance to manual therapy this date and was able to tolerate moderate pressure to muscle spasms. Pt was advised to monitor her symptoms following manual therapy. Pt continues to benefit from skilled therapeutic interventions in order to improve tolerance to movement and assist with pain management. Pt may benefit from aquatic therapy as pt was educated on the benefits of this form of therapy. [] No change. [] Other:    STG: (to be met in 6 treatments)  1. ? Pain: Pt will report less than or equal to 7-8/10 low back pain with sitting unsupported for 5 minutes and standing at the kitchen sink washing dishes or folding clothes for 10 minutes in order to improve tolerance to completion of house maintenance and ADLs. 2. ? ROM: Pt will be able to perform L side bending to her L knee with 0/10 complaints of anterior thigh pain in order to demonstrate improved flexibility and stabilization of herniated discs to promote improved tolerance to movement when completing ADLs and housework. 3. ? Strength: Pt will be able to perform 5 TrA isometric holds for 5 seconds without cueing from therapist in order to demonstrate muscle activation when sitting, standing, walking, and completing ADLs.   4. ? Function: Pt will score less than or equal to 73% on the Oswestry in order to demonstrate an improvement in function. 5. Independent with Home Exercise Programs in order to promote a healthy lifestyle  6. Demonstrate Knowledge of fall prevention  LTG: (to be met in 12 treatments)  1. ? Pain: Pt will report less than or equal to 5-6/10 low back pain with sitting unsupported for 5 minutes and standing at the kitchen sink washing dishes or folding clothes for 10 minutes in order to improve tolerance to completion of house maintenance and ADLs. 2. ? ROM: Pt will be able to perform lumbar AROM in all planes without an increase in low back pain and without reports of LE pain in order to promote improved tolerance and confidence with movement when completing ADLs and promoting ability to remain physically active. 3. ? Strength: Pt will be able to complete 10 minutes of standing core and LE strengthening exercises with demonstrating an upright posture in order to improve pts standing tolerance to promote ease with completion of household chores. 4. ? Strength: Pt will improve BLE strength to greater than or equal to 4+/5 LE and 3/5 abdominal strength in order to improve overall core strength to promote tolerance to standing, walking, and completing ADLs. 5. ? Function: Pt will score less than or equal to 68% on the Oswestry in order to demonstrate an improvement in function.        Patient goals: decrease pain    Pt. Education:  [x] Yes  [] No  [] Reviewed Prior HEP/Ed  Method of Education: [x] Verbal  [] Demo  [] Written  Comprehension of Education:  [x] Verbalizes understanding. [] Demonstrates understanding. [] Needs review. [x] Demonstrates/verbalizes HEP/Ed previously given. Plan: [x] Continue per plan of care.    [] Other:      Time In: 9728 a            Time Out:  [address]    Electronically signed by:  Gucci Moreau, PT

## 2018-03-08 NOTE — FLOWSHEET NOTE
[] [facility] [place]       Outpatient Physical        Therapy       [address]       Phone: [phone]       Fax: [phone] [x] [facility] at [address]       Phone: [phone]       Fax: [phone] [] [facility] Promotion  [address] [place] [address]   Phone: [phone]   Fax:  [phone]     Physical Therapy Daily Treatment Note    Date:  3/8/2018  Patient Name:  Tivis Holter    :  1970  MRN: [id number]  Physician: Jagdeep Cadena MD                                    Insurance: [place] [employer]  Medical Diagnosis: chronic L sided back, L leg pain                       Rehab Codes: M54.5, M54.6, M79.1, R26.2NEC, R29.3, M62.81  Onset Date: 18               Next 's appt. : 18  Visit# / total visits:    Cancels/No Shows: 1/0    Subjective:    Pain:  [x] Yes  [] No Location: neck; L sided low back N/A Pain Rating: (0-10 scale) 8/10  Pain altered Tx:  [] No  [] Yes  Action:  Comments: Pt reports her pain is the same. Pt notes she is having some side pain when she is finding them. Pt reports they hurt so bad. Pt reports it hurts to lay on the L hip. Pt reports she is to get a CT scan with dye next Wednesday to look for blockage. Urologist states her kidneys are not the cause of her back pain.       Objective:      Modalities: HP to neck and low back in supine for 15' (3 covers and 2 towels)   Precautions:   Exercises:  Bold Completed 3/5/18   Exercise Reps/ Time Weight/ Level Comments   Nu-step/Sci-Fit 6' L2  NT 3/5/18         LTR 10x ea 2  Felt a pull more on L side    TrA activation 10x5\"     Alt arm raises 10x   maintaining TrA activation   Unilateral leg marches 10x           Prone lying 24'       Prone on elbows 2'      glute sets 10x5\"             Sitting      Scapular retractions 10x5\"     pec stretch- doorway 3x20\"     Pt education     Benefits of aquatic therapy   Manual therapy: 24'  R sidelying: Foam rolling to L ITB and hip abductor musculature   Prone lying: foam rolling to L glute; trigger point/sustained pressure over the L piriformis; trigger point to L sided paraspinals (30-60 second sustained holds); PA glides grade I to lumbar spine; PA glides to thoracic spine with end range thrust       Specific Instructions for next treatment:    Treatment Charges: Mins Units   []  Modalities:HP 15 0   [x]  Ther Exercise 25 2   []  Manual Therapy     []  Ther Activities     []  Aquatics     []  Vasocompression     []  Other     Total Treatment time 25 2       Assessment: [x] Progressing toward goals. Pt with good tolerance to manual therapy this date and was able to tolerate moderate pressure to muscle spasms. Pt was advised to monitor her symptoms following manual therapy. Pt continues to benefit from skilled therapeutic interventions in order to improve tolerance to movement and assist with pain management. Pt may benefit from aquatic therapy as pt was educated on the benefits of this form of therapy. [] No change. [] Other:    STG: (to be met in 6 treatments)  1. ? Pain: Pt will report less than or equal to 7-8/10 low back pain with sitting unsupported for 5 minutes and standing at the kitchen sink washing dishes or folding clothes for 10 minutes in order to improve tolerance to completion of house maintenance and ADLs. 2. ? ROM: Pt will be able to perform L side bending to her L knee with 0/10 complaints of anterior thigh pain in order to demonstrate improved flexibility and stabilization of herniated discs to promote improved tolerance to movement when completing ADLs and housework. 3. ? Strength: Pt will be able to perform 5 TrA isometric holds for 5 seconds without cueing from therapist in order to demonstrate muscle activation when sitting, standing, walking, and completing ADLs.   4. ? Function: Pt will score less than or equal to 73% on the Oswestry in order to

## 2018-03-12 ENCOUNTER — HOSPITAL ENCOUNTER (OUTPATIENT)
Dept: PHYSICAL THERAPY | Facility: CLINIC | Age: 48
Setting detail: THERAPIES SERIES
Discharge: HOME | End: 2018-03-12
Payer: MEDICARE

## 2018-03-12 PROCEDURE — 97140 MANUAL THERAPY 1/> REGIONS: CPT

## 2018-03-15 ENCOUNTER — TELEPHONE (OUTPATIENT)
Dept: FAMILY MEDICINE CLINIC | Age: 48
End: 2018-03-15

## 2018-03-15 ENCOUNTER — HOSPITAL ENCOUNTER (OUTPATIENT)
Dept: PHYSICAL THERAPY | Facility: CLINIC | Age: 48
Setting detail: THERAPIES SERIES
Discharge: HOME | End: 2018-03-15
Payer: MEDICARE

## 2018-03-15 PROCEDURE — 97140 MANUAL THERAPY 1/> REGIONS: CPT

## 2018-03-15 NOTE — TELEPHONE ENCOUNTER
Luisa from [facility], regarding appeal for Pt's MRI, lumbar. Needs office notes and physical therapy notes pertaining to this MRI. [facility] Moran  fax# [phone]  Faxed to her.

## 2018-03-15 NOTE — FLOWSHEET NOTE
[] [facility]       Outpatient Physical        Therapy       [address]       Phone: [phone]       Fax: [phone] [x] [facility] for Health Promotion at [address] [facility]       Phone: [phone]       Fax: [phone] [] [facility] for Health Promotion  [address]   Phone: [phone]   Fax:  [phone]     Physical Therapy Daily Treatment Note    Date:  3/15/2018  Patient Name:  Casandra Godwin    :  1970  MRN: [id number]  Physician: David Reyes MD                                    Insurance: [place] [employer]  Medical Diagnosis: chronic L sided back, L leg pain                       Rehab Codes: M54.5, M54.6, M79.1, R26.2NEC, R29.3, M62.81  Onset Date: 18               Next 's appt. : 18  Visit# / total visits:    Cancels/No Shows: 1/0    Subjective:    Pain:  [x] Yes  [] No Location: neck; L sided low back  Pain Rating: (0-10 scale) 5/10-low back 7-neck  Pain altered Tx:  [x] No  [] Yes  Action:  Comments: Pt reports her back is feeling a little better from last treatment. She states she did not have much pain walking around as she usually doesn't. Pt had pain injections in her neck Tuesday and she said did not like it and had a lot of pain with them. Pt reports they did find a kidney stone on her L side and it is blocking, so she will be having surgery on Friday.      Objective:  Modalities: HP to neck and low back in supine for 20' (3 covers and 2 towels)   Precautions:   Exercises:  Bold Completed 3/15/18   Exercise Reps/ Time Weight/ Level Comments   Nu-step/Sci-Fit 6' L2  NT 3/5/18         LTR 5\"x10 ea   Felt a pull more on L side    Piriformis Stretch 3x30\"     TrA activation 10x5\"     Alt arm raises 10x   maintaining TrA activation   Unilateral leg marches 10x           Prone lying 28'       Prone on elbows 2'      glute sets 10x5\"             Sitting      Scapular retractions 10x5\" function. 5. Independent with Home Exercise Programs in order to promote a healthy lifestyle  6. Demonstrate Knowledge of fall prevention  LTG: (to be met in 12 treatments)  1. ? Pain: Pt will report less than or equal to 5-6/10 low back pain with sitting unsupported for 5 minutes and standing at the kitchen sink washing dishes or folding clothes for 10 minutes in order to improve tolerance to completion of house maintenance and ADLs. 2. ? ROM: Pt will be able to perform lumbar AROM in all planes without an increase in low back pain and without reports of LE pain in order to promote improved tolerance and confidence with movement when completing ADLs and promoting ability to remain physically active. 3. ? Strength: Pt will be able to complete 10 minutes of standing core and LE strengthening exercises with demonstrating an upright posture in order to improve pts standing tolerance to promote ease with completion of household chores. 4. ? Strength: Pt will improve BLE strength to greater than or equal to 4+/5 LE and 3/5 abdominal strength in order to improve overall core strength to promote tolerance to standing, walking, and completing ADLs. 5. ? Function: Pt will score less than or equal to 68% on the Oswestry in order to demonstrate an improvement in function.        Patient goals: decrease pain    Pt. Education:  [x] Yes  [] No  [] Reviewed Prior HEP/Ed  Method of Education: [x] Verbal: educated trying to use foam roll against wall at home  [] Demo  [] Written  Comprehension of Education:  [x] Verbalizes understanding. [] Demonstrates understanding. [] Needs review. [x] Demonstrates/verbalizes HEP/Ed previously given. Plan: [x] Continue per plan of care.    [] Other:      Time In: 7896 a            Time Out:  8123L    Electronically signed by:  Gini Beasley PTA

## 2018-03-20 ENCOUNTER — HOSPITAL ENCOUNTER (OUTPATIENT)
Dept: PHYSICAL THERAPY | Facility: CLINIC | Age: 48
Setting detail: THERAPIES SERIES
Discharge: HOME | End: 2018-03-20
Payer: MEDICARE

## 2018-03-20 PROCEDURE — 97110 THERAPEUTIC EXERCISES: CPT

## 2018-03-20 PROCEDURE — 97140 MANUAL THERAPY 1/> REGIONS: CPT

## 2018-03-20 NOTE — FLOWSHEET NOTE
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 Due to these signs, core strengthening was reintegrated into the program this date. Pt required verbal and tactile cueing for proper posturing to promote ideal posture and proper muscle strengthening. The neck and thoracic spine will continue to be addressed pending approval from PCP. [] No change. [] Other:    STG: (to be met in 6 treatments)  1. ? Pain: Pt will report less than or equal to 7-8/10 low back pain with sitting unsupported for 5 minutes and standing at the kitchen sink washing dishes or folding clothes for 10 minutes in order to improve tolerance to completion of house maintenance and ADLs. Not met 3/20/18  2. ? ROM: Pt will be able to perform L side bending to her L knee with 0/10 complaints of anterior thigh pain in order to demonstrate improved flexibility and stabilization of herniated discs to promote improved tolerance to movement when completing ADLs and housework. MET 3/20/18 (not front of thigh and sides)  3. ? Strength: Pt will be able to perform 5 TrA isometric holds for 5 seconds without cueing from therapist in order to demonstrate muscle activation when sitting, standing, walking, and completing ADLs. MET 3/20/18  4. ? Function: Pt will score less than or equal to 73% on the Oswestry in order to demonstrate an improvement in function. 5. Independent with Home Exercise Programs in order to promote a healthy lifestyle Ongoing 3/20/18  6. Demonstrate Knowledge of fall prevention  LTG: (to be met in 12 treatments)  1. ? Pain: Pt will report less than or equal to 5-6/10 low back pain with sitting unsupported for 5 minutes and standing at the kitchen sink washing dishes or folding clothes for 10 minutes in order to improve tolerance to completion of house maintenance and ADLs.   2. ? ROM: Pt will be able to perform lumbar AROM in all planes without an increase in low back pain and without reports of

## 2018-03-22 ENCOUNTER — HOSPITAL ENCOUNTER (OUTPATIENT)
Dept: PHYSICAL THERAPY | Facility: CLINIC | Age: 48
Setting detail: THERAPIES SERIES
Discharge: HOME | End: 2018-03-22
Payer: MEDICARE

## 2018-03-22 PROCEDURE — 97110 THERAPEUTIC EXERCISES: CPT

## 2018-03-22 PROCEDURE — 97140 MANUAL THERAPY 1/> REGIONS: CPT

## 2018-03-22 NOTE — FLOWSHEET NOTE
[] [facility]       Outpatient Physical        Therapy       [address]       Phone: [phone]       Fax: [phone] [x] [facility] at [address] [facility]       Phone: [phone]       Fax: [phone] [] [facility]. [address] [place] [address] Health Promotion  [address] [place] [address]   Phone: [phone]   Fax:  [phone]     Physical Therapy Daily Treatment Note    Date:  3/22/2018  Patient Name:  Nidia uBrt    :  1970  MRN: [id number]  Physician: Nicole Crabtree MD                                    Insurance: [place] [employer]  Medical Diagnosis: chronic L sided back, L leg pain                       Rehab Codes: M54.5, M54.6, M79.1, R26.2NEC, R29.3, M62.81  Onset Date: 18               Next 's appt. : 18  Visit# / total visits:    Cancels/No Shows: 1/0    Subjective:    Pain:  [x] Yes  [] No Location: neck; L sided low back  Pain Rating: (0-10 scale) 5-6/10-L low back 6--neck (R side)  Pain altered Tx:  [x] No  [] Yes  Action:  Comments: Pt continued primary complaint is L Low back and R neck. Pt notes pain never got any worse or better since last treatment. Objective:  Modalities: HP to neck and low back in supine for 15' (3 covers and 2 towels)   Precautions:   Exercises:  Bold Completed  Exercise Reps/ Time Weight/ Level Comments   Nu-step/Sci-Fit 6' L2    Chin tucks 10x  Added 3/20   LTR 5\"x10 ea   Felt a pull more on L side    Hamstring stretch 2x30\" ea  Added 3/20/18   Piriformis Stretch 3x30\"     TrA activation 5x     Alt arm raises 10x   maintaining TrA activation   Unilateral leg marches 10x     Bridging 10x  Added 3/20/18  Abdominal activation   Open books 10x ea  Thoracic rotation  Added 3/20- not today, caused cramp.          Quadruped      Cat camel 10x  Emphasis on thoracic flexion  Added 3/20 - Not today -per pt request         Prone lying 8'             Hip flexor stretch 3'x20\"  Manual Standing   Added 3/20/18  Emphasis on core activation   Shoulder extension 2x10  yellow    Rows 2x10 yellow    B ER 10x yellow Towel roll behind head to promote postural alignment   Wall slides 10x     Pt education        Manual therapy: 16'  Cervical spine assessment: increased muscle tension noted in R suboccipital musculature; Vertebral artery test: pos for mild dizziness with head rotation, improved with neutral alignment; decreased suboccipital mobility on the R with side bending to the L and forward flexion. - 8' of suboccipital release with emphasis on the R side followed by chin tucks  Prone 8': trigger point addressed to R piriformis with minimal restriction noted on the L; tiger tail (stick rolling) to the glute/pirifromis/and hamstring muscle bellies. Thoracic spinal mobility: decreased PA glides throughout t-spine       Specific Instructions for next treatment: treat neck with manual     Treatment Charges: Mins Units   [x]  Modalities:HP 15 0   [x]  Ther Exercise 35 2   [x]  Manual Therapy 16 1   []  Ther Activities     []  Aquatics     []  Vasocompression     []  Other     Total Treatment time 51 3       Assessment: [] Progressing toward goals. [x] No change: Pt with increased pain in both back and neck with all ex. Pt notes a \"pulling \" and sharp pain with activity. Palpable trigger point in bilateral upper traps, scalenes and SCM. [] Other:    STG: (to be met in 6 treatments)  1. ? Pain: Pt will report less than or equal to 7-8/10 low back pain with sitting unsupported for 5 minutes and standing at the kitchen sink washing dishes or folding clothes for 10 minutes in order to improve tolerance to completion of house maintenance and ADLs.  Not met 3/20/18  2. ? ROM: Pt will be able to perform L side bending to her L knee with 0/10 complaints of anterior thigh pain in order to demonstrate improved flexibility and stabilization of herniated discs to promote improved tolerance to movement

## 2018-03-27 ENCOUNTER — APPOINTMENT (OUTPATIENT)
Dept: PHYSICAL THERAPY | Facility: CLINIC | Age: 48
End: 2018-03-27
Payer: MEDICARE

## 2018-03-29 ENCOUNTER — HOSPITAL ENCOUNTER (OUTPATIENT)
Dept: PHYSICAL THERAPY | Facility: CLINIC | Age: 48
Setting detail: THERAPIES SERIES
Discharge: HOME | End: 2018-03-29
Payer: MEDICARE

## 2018-03-29 NOTE — FLOWSHEET NOTE
[] [facility]        Outpatient Physical                Therapy       [address]       Phone: [phone]       Fax: [phone] [] [facility]       Promotion at [address] [facility]       Phone: [phone]       Fax: [phone] [] [facility] [place] [place]      for Health Promotion     [address] [address]      Phone: [phone]      Fax:  [phone]     Physical Therapy Cancel/No Show note    Date: 3/29/2018  Patient: Martha Patrick  : 1970  MRN: [id number]    Cancels/No Shows to date:     For today's appointment patient:  [x]  Cancelled  []  Rescheduled appointment  []  No-show     Reason given by patient:  []  Patient ill  []  Conflicting appointment  []  No transportation    []  Conflict with work  []  No reason given  []  Weather related  [x]  Other:      Comments:   Pt cancelled appointments this week and will call back next week to reschedule.      []  Next appointment was confirmed    Electronically signed by: Nena Sanderson PTA

## 2018-04-05 ENCOUNTER — HOSPITAL ENCOUNTER (OUTPATIENT)
Dept: PHYSICAL THERAPY | Facility: CLINIC | Age: 48
Setting detail: THERAPIES SERIES
Discharge: HOME | End: 2018-04-05
Payer: MEDICARE

## 2018-05-14 ENCOUNTER — OFFICE VISIT (OUTPATIENT)
Dept: FAMILY MEDICINE CLINIC | Age: 48
End: 2018-05-14
Payer: MEDICARE

## 2018-05-14 VITALS
SYSTOLIC BLOOD PRESSURE: 130 MMHG | HEART RATE: 90 BPM | WEIGHT: 205.47 LBS | OXYGEN SATURATION: 97 % | RESPIRATION RATE: 16 BRPM | DIASTOLIC BLOOD PRESSURE: 82 MMHG | HEIGHT: 68 IN | BODY MASS INDEX: 31.14 KG/M2

## 2018-05-14 DIAGNOSIS — G89.29 CHRONIC LEFT-SIDED LOW BACK PAIN WITHOUT SCIATICA: Primary | ICD-10-CM

## 2018-05-14 DIAGNOSIS — M54.50 CHRONIC LEFT-SIDED LOW BACK PAIN WITHOUT SCIATICA: Primary | ICD-10-CM

## 2018-05-14 DIAGNOSIS — L71.9 ROSACEA: ICD-10-CM

## 2018-05-14 PROCEDURE — G8427 DOCREV CUR MEDS BY ELIG CLIN: HCPCS | Performed by: FAMILY MEDICINE

## 2018-05-14 PROCEDURE — 99213 OFFICE O/P EST LOW 20 MIN: CPT | Performed by: FAMILY MEDICINE

## 2018-05-14 PROCEDURE — 1036F TOBACCO NON-USER: CPT | Performed by: FAMILY MEDICINE

## 2018-05-14 PROCEDURE — G8417 CALC BMI ABV UP PARAM F/U: HCPCS | Performed by: FAMILY MEDICINE

## 2018-05-14 ASSESSMENT — ENCOUNTER SYMPTOMS
SHORTNESS OF BREATH: 0
EYE PAIN: 0
BLOOD IN STOOL: 0
BACK PAIN: 1

## 2018-07-06 ENCOUNTER — TELEPHONE (OUTPATIENT)
Dept: FAMILY MEDICINE CLINIC | Age: 48
End: 2018-07-06

## 2018-07-06 DIAGNOSIS — R53.83 OTHER FATIGUE: Primary | ICD-10-CM

## 2018-07-06 NOTE — TELEPHONE ENCOUNTER
Her thyroid, B12, hemoglobin levels were all normal on last check. The only thing left to measure is her iron level, I put in an order. She has sleep apnea and is not using her CPAP machine, this is a huge cause of excessive fatigue for a lot of patients. Please ask her to restart on the CPAP machine to see if that improves her symptoms.

## 2018-07-06 NOTE — TELEPHONE ENCOUNTER
Gave pt the msg and she will get labs done Monday. She states she hasnt used the CPAP machine in years.

## 2018-08-01 ENCOUNTER — HOSPITAL ENCOUNTER (OUTPATIENT)
Age: 48
Setting detail: SPECIMEN
Discharge: HOME | End: 2018-08-01
Payer: MEDICARE

## 2018-08-01 ENCOUNTER — HOSPITAL ENCOUNTER (OUTPATIENT)
Dept: GENERAL RADIOLOGY | Age: 48
Discharge: HOME | End: 2018-08-03
Payer: MEDICARE

## 2018-08-01 ENCOUNTER — HOSPITAL ENCOUNTER (OUTPATIENT)
Age: 48
Discharge: HOME | End: 2018-08-03
Payer: MEDICARE

## 2018-08-01 ENCOUNTER — OFFICE VISIT (OUTPATIENT)
Dept: FAMILY MEDICINE CLINIC | Age: 48
End: 2018-08-01
Payer: MEDICARE

## 2018-08-01 VITALS
DIASTOLIC BLOOD PRESSURE: 86 MMHG | WEIGHT: 202 LBS | BODY MASS INDEX: 31.71 KG/M2 | SYSTOLIC BLOOD PRESSURE: 131 MMHG | RESPIRATION RATE: 16 BRPM | HEART RATE: 89 BPM | HEIGHT: 67 IN

## 2018-08-01 DIAGNOSIS — R53.83 OTHER FATIGUE: ICD-10-CM

## 2018-08-01 DIAGNOSIS — K58.0 IRRITABLE BOWEL SYNDROME WITH DIARRHEA: ICD-10-CM

## 2018-08-01 DIAGNOSIS — M72.2 PLANTAR FASCIITIS: ICD-10-CM

## 2018-08-01 DIAGNOSIS — M72.2 PLANTAR FASCIITIS: Primary | ICD-10-CM

## 2018-08-01 DIAGNOSIS — I10 ESSENTIAL HYPERTENSION: ICD-10-CM

## 2018-08-01 DIAGNOSIS — M79.7 FIBROMYALGIA: ICD-10-CM

## 2018-08-01 DIAGNOSIS — G47.33 OSA (OBSTRUCTIVE SLEEP APNEA): ICD-10-CM

## 2018-08-01 LAB — FERRITIN: 142 UG/L (ref 13–150)

## 2018-08-01 PROCEDURE — 73650 X-RAY EXAM OF HEEL: CPT

## 2018-08-01 PROCEDURE — 1036F TOBACCO NON-USER: CPT | Performed by: FAMILY MEDICINE

## 2018-08-01 PROCEDURE — G8427 DOCREV CUR MEDS BY ELIG CLIN: HCPCS | Performed by: FAMILY MEDICINE

## 2018-08-01 PROCEDURE — 99214 OFFICE O/P EST MOD 30 MIN: CPT | Performed by: FAMILY MEDICINE

## 2018-08-01 PROCEDURE — G8417 CALC BMI ABV UP PARAM F/U: HCPCS | Performed by: FAMILY MEDICINE

## 2018-08-01 RX ORDER — TRAMADOL HYDROCHLORIDE 50 MG/1
50 TABLET ORAL PRN
COMMUNITY

## 2018-08-01 RX ORDER — NAPROXEN SODIUM 550 MG/1
TABLET ORAL
Refills: 0 | COMMUNITY
Start: 2018-06-27 | End: 2019-02-25

## 2018-08-01 RX ORDER — CLONAZEPAM 0.5 MG/1
TABLET ORAL 2 TIMES DAILY
COMMUNITY
Start: 2018-07-18 | End: 2019-02-25

## 2018-08-01 ASSESSMENT — ENCOUNTER SYMPTOMS
VOMITING: 0
EYE PAIN: 0
DIARRHEA: 1
BLOOD IN STOOL: 0
SHORTNESS OF BREATH: 0
CONSTIPATION: 1
BACK PAIN: 1

## 2018-08-01 NOTE — PROGRESS NOTES
HYPERTENSION visit     BP Readings from Last 3 Encounters:   08/01/18 (!) 137/90   05/14/18 130/82   02/26/18 134/86       LDL Cholesterol (mg/dL)   Date Value   02/05/2018 104     HDL (mg/dL)   Date Value   02/05/2018 50     BUN (mg/dL)   Date Value   02/05/2018 17     CREATININE (mg/dL)   Date Value   02/26/2018 0.78     Glucose (mg/dL)   Date Value   02/05/2018 92              Have you changed or started any medications since your last visit including any over-the-counter medicines, vitamins, or herbal medicines? no   Have you stopped taking any of your medications? Is so, why? -  no  Are you having any side effects from any of your medications? - yes - Trulance cause diarrhea   How often do you miss doses of your medication? rare      Have you seen any other physician or provider since your last visit? yes - GI    Have you had any other diagnostic tests since your last visit?  no   Have you been seen in the emergency room and/or had an admission in a hospital since we last saw you?  no   Have you had your routine dental cleaning in the past 6 months?  no     Do you have an active MyChart account? If no, what is the barrier?   Yes    Patient Care Team:  Argentina Mullins MD as PCP - General (Family Medicine)    Medical History Review  Past Medical, Family, and Social History reviewed and does contribute to the patient presenting condition    Health Maintenance   Topic Date Due    HIV screen  08/31/1987    Cervical cancer screen  08/31/1993    DTaP/Tdap/Td vaccine (2 - Td) 09/24/2017    Flu vaccine (1) 09/01/2018    Potassium monitoring  02/26/2019    Creatinine monitoring  02/26/2019    Lipid screen  02/05/2023    Pneumococcal med risk  Completed
with left-sided sciatica    Chronic migraine       Past Medical History:   Diagnosis Date    Anxiety     Depression     Fibromyalgia      Past Surgical History:   Procedure Laterality Date     SECTION      CHOLECYSTECTOMY      COCCYX REMOVAL       Family History   Problem Relation Age of Onset    High Blood Pressure Mother     Diabetes Father     Breast Cancer Maternal Grandmother      Social History   Substance Use Topics    Smoking status: Never Smoker    Smokeless tobacco: Never Used    Alcohol use Yes      Comment: occasional       Current Outpatient Prescriptions:     clonazePAM (KLONOPIN) 0.5 MG tablet, Take by mouth 2 times daily. ., Disp: , Rfl:     traMADol (ULTRAM) 50 MG tablet, Take 50 mg by mouth as needed for Pain. ., Disp: , Rfl:     Ketorolac Tromethamine (TORADOL ORAL PO), Take by mouth, Disp: , Rfl:     Plecanatide (TRULANCE PO), Take by mouth, Disp: , Rfl:     ondansetron (ZOFRAN) 4 MG tablet, Take 1 tablet by mouth daily as needed for Nausea or Vomiting, Disp: 30 tablet, Rfl: 0    sertraline (ZOLOFT) 25 MG tablet, Take 0.5 tablets by mouth daily, Disp: 1 tablet, Rfl: 0    Levonorgest-Eth Estrad -Day 0.1-0.02 & 0.01 MG TABS, Take 1 tablet by mouth, Disp: , Rfl:     omeprazole (PRILOSEC) 20 MG delayed release capsule, Take 1 capsule by mouth daily, Disp: 90 capsule, Rfl: 0    naproxen sodium (ANAPROX) 550 MG tablet, , Disp: , Rfl: 0    Brimonidine Tartrate (MIRVASO) 0.33 % GEL, Apply once daily, Disp: 30 g, Rfl: 1    Subjective:     Review of Systems   Constitutional: Positive for fatigue. Negative for appetite change, diaphoresis, fever and unexpected weight change. HENT: Negative for ear pain. Eyes: Negative for pain. Respiratory: Negative for shortness of breath. Cardiovascular: Negative for chest pain and leg swelling. Gastrointestinal: Positive for constipation and diarrhea. Negative for blood in stool and vomiting.    Musculoskeletal: Positive for back

## 2018-08-13 ENCOUNTER — OFFICE VISIT (OUTPATIENT)
Dept: PODIATRY | Age: 48
End: 2018-08-13
Payer: MEDICARE

## 2018-08-13 ENCOUNTER — TELEPHONE (OUTPATIENT)
Dept: FAMILY MEDICINE CLINIC | Age: 48
End: 2018-08-13

## 2018-08-13 VITALS — RESPIRATION RATE: 16 BRPM | BODY MASS INDEX: 31.71 KG/M2 | HEIGHT: 67 IN | WEIGHT: 202 LBS

## 2018-08-13 DIAGNOSIS — M79.2 NEURITIS: ICD-10-CM

## 2018-08-13 DIAGNOSIS — M79.605 PAIN IN BOTH LOWER EXTREMITIES: ICD-10-CM

## 2018-08-13 DIAGNOSIS — M72.2 PLANTAR FASCIITIS, RIGHT: Primary | ICD-10-CM

## 2018-08-13 DIAGNOSIS — M72.2 PLANTAR FASCIITIS, LEFT: ICD-10-CM

## 2018-08-13 DIAGNOSIS — R26.2 DIFFICULTY WALKING: ICD-10-CM

## 2018-08-13 DIAGNOSIS — M79.604 PAIN IN BOTH LOWER EXTREMITIES: ICD-10-CM

## 2018-08-13 PROCEDURE — G8427 DOCREV CUR MEDS BY ELIG CLIN: HCPCS | Performed by: PODIATRIST

## 2018-08-13 PROCEDURE — G8417 CALC BMI ABV UP PARAM F/U: HCPCS | Performed by: PODIATRIST

## 2018-08-13 PROCEDURE — 99203 OFFICE O/P NEW LOW 30 MIN: CPT | Performed by: PODIATRIST

## 2018-08-13 PROCEDURE — 1036F TOBACCO NON-USER: CPT | Performed by: PODIATRIST

## 2018-08-13 NOTE — PROGRESS NOTES
[address]  [address]  [address]  Dept: [phone]  Dept Fax: [phone]    NEW PATIENT PROGRESS NOTE  Date of patient's visit: 8/13/2018  Patient's Name:  Can Delacruz YOB: 1970            Patient Care Team:  Narayan Delcid MD as PCP - General (Family Medicine)  Emma Del Castillo DPM as Physician (Podiatry)        Chief Complaint   Patient presents with    New Patient    Foot Pain     bilateral         HPI: Can Delacruz is a 41 y.o. female who presents to the office today complaining of bilateral foot pain Symptoms began years ago. Patient relates pain is Present. Pain is rated 10 out of 10 and is described as constant. Treatments prior to today's visit include: orthotics and stretching exercises. She currently sees neurologist for fibromyalgia. Currently denies F/C/N/V. Allergies   Allergen Reactions    Azithromycin Hives    Cefuroxime Axetil Hives    Penicillins Hives    Sulfa Antibiotics Hives    Bactrim [Sulfamethoxazole-Trimethoprim]     Dicyclomine     Lyrica [Pregabalin]     Minocycline        Past Medical History:   Diagnosis Date    Anxiety     Depression     Fibromyalgia        Prior to Admission medications    Medication Sig Start Date End Date Taking? Authorizing Provider   clonazePAM (KLONOPIN) 0.5 MG tablet Take by mouth 2 times daily. . 7/18/18  Yes Historical Provider, MD   traMADol (ULTRAM) 50 MG tablet Take 50 mg by mouth as needed for Pain. .   Yes Historical Provider, MD   Ketorolac Tromethamine (TORADOL ORAL PO) Take by mouth   Yes Historical Provider, MD   naproxen sodium (ANAPROX) 550 MG tablet  6/27/18  Yes Historical Provider, MD   Plecanatide (TRULANCE PO) Take by mouth   Yes Historical Provider, MD   Brimonidine Tartrate (MIRVASO) 0.33 % GEL Apply once daily 5/14/18  Yes Narayan Delcid MD   ondansetron (ZOFRAN) 4 MG tablet Take 1 tablet by mouth daily as needed for Nausea or Vomiting 18  Yes Estephania Tompkins MD   sertraline (ZOLOFT) 25 MG tablet Take 0.5 tablets by mouth daily 18  Yes Estephania Tompkins MD   Levonorgest-Eth Estrad 91-Day 0.1-0.02 & 0.01 MG TABS Take 1 tablet by mouth 17  Yes Historical Provider, MD   omeprazole (PRILOSEC) 20 MG delayed release capsule Take 1 capsule by mouth daily 18  Yes Estephania Tompkins MD       Past Surgical History:   Procedure Laterality Date     SECTION      CHOLECYSTECTOMY      COCCYX REMOVAL         Family History   Problem Relation Age of Onset    High Blood Pressure Mother     Diabetes Father     Breast Cancer Maternal Grandmother        Social History   Substance Use Topics    Smoking status: Never Smoker    Smokeless tobacco: Never Used    Alcohol use Yes      Comment: occasional       Review of Systems  Review of Systems - History obtained from chart review and the patient  General ROS: negative for - chills, fatigue, fever, night sweats or weight gain  Constitutional: Negative for chills, diaphoresis, fatigue, fever and unexpected weight change. Musculoskeletal: Positive for arthralgias, gait problem and joint swelling. Neurological ROS: negative for - behavioral changes, confusion, headaches or seizures. Negative for weakness and numbness. Dermatological ROS: negative for - mole changes, rash  Cardiovascular: Negative for leg swelling. Gastrointestinal: Negative for constipation, diarrhea, nausea and vomiting. Lower Extremity Physical Examination:     Vitals:   Vitals:    18 1038   Resp: 16     General: AAO x 3 in NAD. Dermatologic Exam:  Skin lesion/ulceration Absent . Skin No rashes or nodules noted. .       Musculoskeletal:     1st MPJ ROM decreased, Bilateral.  Muscle strength 5/5, Bilateral.  Pain present upon palpation of plantar calcaneus. Medial longitudinal arch, Bilateral increased.   Ankle ROM WNL,Bilateral.    Dorsally contracted digits absent digits 1-5 Bilateral.

## 2018-08-13 NOTE — PROGRESS NOTES
Subjective:      Patient ID: Windy Saha is a 41 y.o. female.     HPI    Review of Systems    Objective:   Physical Exam    Assessment:      ***      Plan:      ***        Delia Maloney DPM

## 2018-08-13 NOTE — TELEPHONE ENCOUNTER
Pt needs an order for Dry needling done on her back at Physical Therapy at [facility]. If she is willing to write that order [phone].

## 2018-08-15 ENCOUNTER — HOSPITAL ENCOUNTER (OUTPATIENT)
Dept: PHYSICAL THERAPY | Facility: CLINIC | Age: 48
Setting detail: THERAPIES SERIES
Discharge: HOME | End: 2018-08-15
Payer: MEDICARE

## 2018-08-15 PROCEDURE — 97140 MANUAL THERAPY 1/> REGIONS: CPT

## 2018-08-15 PROCEDURE — 97163 PT EVAL HIGH COMPLEX 45 MIN: CPT

## 2018-08-22 ENCOUNTER — HOSPITAL ENCOUNTER (OUTPATIENT)
Dept: PHYSICAL THERAPY | Facility: CLINIC | Age: 48
Setting detail: THERAPIES SERIES
Discharge: HOME | End: 2018-08-22
Payer: MEDICARE

## 2018-08-22 PROCEDURE — 97140 MANUAL THERAPY 1/> REGIONS: CPT

## 2018-08-22 NOTE — FLOWSHEET NOTE
[] Nicol Alcazar       Outpatient Physical        Therapy       [address]       Phone: [phone]       Fax: [phone] [x] [facility] at [address] [facility]       Phone: [phone]       Fax: [phone] [] [facility]  [address]   Phone: [phone]   Fax:  [phone]     Physical Therapy Daily Treatment Note    Date:  2018  Patient Name:  Giovanna White    :  1970  MRN: [id number]  Physician: Jarred Callaway DPM (plantar fascitis)  Physician: Crystal Bolden MD (back)   Insurance: [employer] advantage  (finished 8 visits earlier this year)  Medical Diagnosis: B plantar fascitis; Pain B LEs; difficulty walking; chronic L sided back pain                      Rehab Codes: M54.9; M79.7; M79.671; M79.672; R29.3; R26.2; M62.81; R53.82  Onset date: 2008               Next Dr's appt. : 18 (podiatry)    Visit# / total visits:   Cancels/No Shows: 0    Subjective:    Pain:  [x] Yes  [] No Location: upper back, B plantar fascia Pain Rating: (0-10 scale) 5/10  Pain altered Tx:  [x] No  [] Yes  Action:  Comments: pt felt fine the day of therapy. The next day there was increased pain; pt stopped gabapentin because she wasn't sure if increased pain was due to the meds or DN/    Objective:  Modalities: HP back and neck, plus cervical wrapped around B feet in supine at end of session, 10 min  Precautions: easily fatigues; easily sore  Exercises:  Exercise Reps/ Time Weight/ Level Comments   Mid thoracic stretch x       Cat/camel on counter x       Pf stretch on step x                           Other:   Therapeutic activities: instructed in using inverted dish pan in sink for dishes to avoid slightly flexed posture     DRY NEEDLING: consent obtained and skin prepped with alcohol; standard procedure followed:    In supine: 3\" B deep fibular homeostatic point (longitudinally, aiming towards plantar fascia)- near full penetration on L; 2/3 on R; 1\" B tibial, 2\" common fibular homeostatic points B; attempted saphenous homeostatic point but with increased tenderness, this was removed. Prone: T3 to C3 with 1\" paravertebral points, bilat (10 total); left in situ 10 min     Specific Instructions for next treatment: , DN , CST?         Treatment Charges: Mins Units   []  Modalities     []  Ther Exercise     [x]  Manual Therapy 30 2   []  Ther Activities     []  Aquatics     []  Vasocompression     []  Other     Total Treatment time 30 2       Assessment: [x] Progressing toward goals: discomfort mostly with deep fibular homeostatic points; there was increased tension with removal of needles B; othera areas tolerated well; assess effectiveness next treatment with very gradual progression of treatment due to increased sensitivity of patient    [] No change. [] Other:    STG: (to be met in 6   treatments)  1. ? Pain: below 5-10/10 range  2. ? ROM: complete spine, cervical and LE stretches to improve overall flexibility  3. ? Strength: tolerate basic stability ex for spine  4. ? Function: modify activities to decrease stress to spine and feet  5. Independent with Home Exercise Program  6. Demonstrate Knowledge of fall prevention  7. +tender spots throughout body     LTG: (to be met in 12  treatments)  1. Pain below 4-8/10 pain levels  2. Improve flexibility of affected areas to WNL  3. At least 4/5 strength of spine and core for increased spinal support  4. Decreased feeling of tightness throughout musculoskeletal system  5. Function improved at least 10 percentage points per LEFI                    Patient goals: \"less pain\"    Pt. Education:  [x] Yes  [] No  [] Reviewed Prior HEP/Ed  Method of Education: [x] Verbal  [x] Demo: DN  [x] Written: fall prevention  Comprehension of Education:  [x] Verbalizes understanding. [x] Demonstrates understanding. [x] Needs review.   [] Demonstrates/verbalizes HEP/Ed previously given. Plan: [x] Continue per plan of care.    [] Other:      Time In: 2:00 pm            Time Out: 3:10 pm    Electronically signed by:  Negra Álvarez PT

## 2018-08-29 ENCOUNTER — HOSPITAL ENCOUNTER (OUTPATIENT)
Dept: PHYSICAL THERAPY | Facility: CLINIC | Age: 48
Setting detail: THERAPIES SERIES
Discharge: HOME | End: 2018-08-29
Payer: MEDICARE

## 2018-08-29 PROCEDURE — 97140 MANUAL THERAPY 1/> REGIONS: CPT

## 2018-08-29 NOTE — FLOWSHEET NOTE
point (longitudinally, aiming towards plantar fascia)- near full penetration on L; 2/3 on R; 1\" B tibial, 2\" common fibular homeostatic points B; attempted saphenous homeostatic point but with increased tenderness, this was removed. Prone: 44 total: T3 to C3 with 1\" paravertebral points, also B greater occipital homeostatic points, B occipital area with 0.5\" needles; Lumbar T2-4with 2\" needles; 3\" (3) bilat superior cluneals; 1\" B sural homeostatic points  left in situ 15 min     MFR in prone from occiput to plantar fascia; most discomfort in c-and upper thoracic spine; felt good at same time; tightest in HS bilat; crepitus R more than L plantar fascia;     Specific Instructions for next treatment: , DN , CST?, stim with needles; add stretches         Treatment Charges: Mins Units   []  Modalities     []  Ther Exercise     [x]  Manual Therapy 50 3   []  Ther Activities     []  Aquatics     []  Vasocompression     []  Other     Total Treatment time 50 3       Assessment: [x] Progressing toward goals: no difficulty during treatment with moderate pressure with MFR: tolerated needles with minimal difficulty  [] No change. [] Other:    STG: (to be met in 6   treatments)  1. ? Pain: below 5-10/10 range  2. ? ROM: complete spine, cervical and LE stretches to improve overall flexibility  3. ? Strength: tolerate basic stability ex for spine  4. ? Function: modify activities to decrease stress to spine and feet  5. Independent with Home Exercise Program  6. Demonstrate Knowledge of fall prevention  7. +tender spots throughout body     LTG: (to be met in 12  treatments)  1. Pain below 4-8/10 pain levels  2. Improve flexibility of affected areas to WNL  3. At least 4/5 strength of spine and core for increased spinal support  4. Decreased feeling of tightness throughout musculoskeletal system  5. Function improved at least 10 percentage points per LEFI                    Patient goals: \"less pain\"    Pt.  Education:  []

## 2018-09-05 ENCOUNTER — APPOINTMENT (OUTPATIENT)
Dept: PHYSICAL THERAPY | Facility: CLINIC | Age: 48
End: 2018-09-05
Payer: MEDICARE

## 2018-09-05 ENCOUNTER — OFFICE VISIT (OUTPATIENT)
Dept: PODIATRY | Age: 48
End: 2018-09-05
Payer: MEDICARE

## 2018-09-05 VITALS — HEIGHT: 67 IN | BODY MASS INDEX: 31.71 KG/M2 | RESPIRATION RATE: 16 BRPM | WEIGHT: 202 LBS

## 2018-09-05 DIAGNOSIS — R26.2 DIFFICULTY WALKING: ICD-10-CM

## 2018-09-05 DIAGNOSIS — M79.605 PAIN IN BOTH LOWER EXTREMITIES: ICD-10-CM

## 2018-09-05 DIAGNOSIS — M72.2 PLANTAR FASCIITIS, BILATERAL: Primary | ICD-10-CM

## 2018-09-05 DIAGNOSIS — M79.604 PAIN IN BOTH LOWER EXTREMITIES: ICD-10-CM

## 2018-09-05 PROCEDURE — G8427 DOCREV CUR MEDS BY ELIG CLIN: HCPCS | Performed by: PODIATRIST

## 2018-09-05 PROCEDURE — 1036F TOBACCO NON-USER: CPT | Performed by: PODIATRIST

## 2018-09-05 PROCEDURE — 99213 OFFICE O/P EST LOW 20 MIN: CPT | Performed by: PODIATRIST

## 2018-09-05 PROCEDURE — L3020 FOOT LONGITUD/METATARSAL SUP: HCPCS | Performed by: PODIATRIST

## 2018-09-05 PROCEDURE — G8417 CALC BMI ABV UP PARAM F/U: HCPCS | Performed by: PODIATRIST

## 2018-09-05 NOTE — PROGRESS NOTES
[facility] PHYSICIANS  [facility] [address]  [address] [facility]   Dept: [phone]  Dept Fax: [phone]    RETURN PATIENT PROGRESS NOTE  Date of patient's visit: 9/5/2018  Patient's Name:  Palomo Chambers YOB: 1970            Patient Care Team:  Alek Alexandra MD as PCP - General (Family Medicine)  Stephanie Starks DPM as Physician (Podiatry)       Palomo Chambers 57 y.o. female that presents for follow-up of   Chief Complaint   Patient presents with    Foot Pain     3 week f/u       Symptoms began years ago and are unchanged . Patient relates pain is Present. Pain is rated 10 out of 10 and is described as intermittent. Treatments prior to today's visit include: visit here where stretching exercises were given along with orthotics. Currently denies F/C/N/V. Allergies   Allergen Reactions    Azithromycin Hives    Cefuroxime Axetil Hives    Penicillins Hives    Sulfa Antibiotics Hives    Bactrim [Sulfamethoxazole-Trimethoprim]     Dicyclomine     Lyrica [Pregabalin]     Minocycline        Past Medical History:   Diagnosis Date    Anxiety     Depression     Fibromyalgia        Prior to Admission medications    Medication Sig Start Date End Date Taking? Authorizing Provider   clonazePAM (KLONOPIN) 0.5 MG tablet Take by mouth 2 times daily. . 7/18/18   Historical Provider, MD   traMADol (ULTRAM) 50 MG tablet Take 50 mg by mouth as needed for Pain. Ana Washington     Historical Provider, MD   Ketorolac Tromethamine (TORADOL ORAL PO) Take by mouth    Historical Provider, MD   naproxen sodium (ANAPROX) 550 MG tablet  6/27/18   Historical Provider, MD   Plecanatide (TRULANCE PO) Take by mouth    Historical Provider, MD   Brimonidine Tartrate (MIRVASO) 0.33 % GEL Apply once daily 5/14/18   Alek Alexandra MD   ondansetron (ZOFRAN) 4 MG tablet Take 1 tablet by mouth daily as needed for Nausea or Vomiting 2/26/18   Alek Alexandra MD   sertraline (ZOLOFT) 25 MG tablet Take 0.5 tablets by mouth daily 2/26/18   Estephania Tompkins MD   Levonorgest-Eth Estrad 91-Day 0.1-0.02 & 0.01 MG TABS Take 1 tablet by mouth 12/22/17   Historical Provider, MD   omeprazole (PRILOSEC) 20 MG delayed release capsule Take 1 capsule by mouth daily 1/26/18   Estephania Tompkins MD       Review of Systems  Review of Systems - History obtained from chart review and the patient  General ROS: negative for - chills, fatigue, fever, night sweats or weight gain  Constitutional: Negative for chills, diaphoresis, fatigue, fever and unexpected weight change. Musculoskeletal: Positive for arthralgias, gait problem and joint swelling. Neurological ROS: negative for - behavioral changes, confusion, headaches or seizures. Negative for weakness and numbness. Dermatological ROS: negative for - mole changes, rash  Cardiovascular: Negative for leg swelling. Gastrointestinal: Negative for constipation, diarrhea, nausea and vomiting. Lower Extremity Physical Examination:     Vitals:   Vitals:    09/05/18 1110   Resp: 16     General: AAO x 3 in NAD. Dermatologic Exam:  Skin lesion/ulceration Absent . Skin No rashes or nodules noted. .       Musculoskeletal:     1st MPJ ROM decreased, Bilateral.  Muscle strength 5/5, Bilateral.  Pain present upon palpation of plantar heel, marion. Medial longitudinal arch, Bilateral increased.   Ankle ROM WNL,Bilateral.    Dorsally contracted digits absent digits 1-5 Bilateral.     Vascular: DP and PT pulses palpable 2/4, Bilateral.  CFT <3 seconds, Bilateral.  Hair growth present to the level of the digits, Bilateral.  Edema absent, Bilateral.  Varicosities absent, Bilateral. Erythema absent, Bilateral    Neurological: Sensation intact to light touch to level of digits, Bilateral.  Protective sensation intact 10/10 sites via 5.07/10g [place] Monofilament, Bilateral.  negative Tinel's, Bilateral.  negative Valleix sign, Bilateral.      Integument: Warm, dry, supple, Bilateral. Open lesion absent, Bilateral.  Interdigital maceration absent to web spaces 1-4, Bilateral.  Nails are normal in length, thickness and color 1-5 bilateral.  Fissures absent, Bilateral.     Asessment: Patient is a 57 y.o. female with:    Diagnosis Orders   1. Plantar fasciitis, bilateral  ND FOOT LONGITUD/METATARSAL SUP   2. Pain in both lower extremities  ND FOOT LONGITUD/METATARSAL SUP   3. Difficulty walking  ND FOOT LONGITUD/METATARSAL SUP         Plan: Patient examined and evaluated. Current condition and treatment options discussed in detail. Casted patient for custom orthotics today. Advised pt to continue icing and elevating. .  Verbal and written instructions given to patient. Contact office with any questions/problems/concerns. No orders of the defined types were placed in this encounter. No orders of the defined types were placed in this encounter. RTC in 1month(s).     9/5/2018      Electronically signed by Octaviano Waters DPM on 9/5/2018 at 11:44 AM  9/5/2018

## 2018-09-13 ENCOUNTER — HOSPITAL ENCOUNTER (OUTPATIENT)
Dept: PHYSICAL THERAPY | Facility: CLINIC | Age: 48
Setting detail: THERAPIES SERIES
Discharge: HOME | End: 2018-09-13
Payer: MEDICARE

## 2018-09-13 PROCEDURE — G0283 ELEC STIM OTHER THAN WOUND: HCPCS

## 2018-09-13 PROCEDURE — 97140 MANUAL THERAPY 1/> REGIONS: CPT

## 2018-09-13 NOTE — FLOWSHEET NOTE
[] CHRIST[facility]O [facility]       Outpatient Physical        Therapy       [address]       Phone: [phone]       Fax: [phone] [x] [facility] at [address] [facility]       Phone: [phone]       Fax: [phone] [] [facility]  [address] [place] [address]   Phone: [phone]   Fax:  [phone]     Physical Therapy Daily Treatment Note    Date:  2018  Patient Name:  Giovanna White    :  1970  MRN: [id number]  Physician: Jarred Callaway DPM (plantar fascitis)  Physician: Arik Holland MD (back)   Insurance: [employer] advantage  (finished 8 visits earlier this year)  Medical Diagnosis: B plantar fascitis; Pain B LEs; difficulty walking; chronic L sided back pain                      Rehab Codes: M54.9; M79.7; M79.671; M79.672; R29.3; R26.2; M62.81; R53.82  Onset date: 2008               Next Dr's appt. : 18 (podiatry)    Visit# / total visits:   Cancels/No Shows: 0    Subjective:    Pain:  [x] Yes  [] No Location: upper back, B plantar fascia Pain Rating: (0-10 scale) 5/10  Pain altered Tx:  [x] No  [] Yes  Action:  Comments: pt saw podiatrist and she suggested seeing rheumatologist; has +GENET; pt to follow up with neurologist tomorrow for fibromyalgia;    Objective:  Modalities: stim R L4/5 to sural and L L4/5 to sural, 15 min; HP back and neck, plus cervical wrapped around B feet in supine at end of session, 15 min  Precautions: easily fatigues; easily sore  Exercises: none today  Exercise Reps/ Time Weight/ Level Comments   Mid thoracic stretch x       Cat/camel on counter x       Pf stretch on step x                           Other:   Not today: Therapeutic activities: instructed in using inverted dish pan in sink for dishes to avoid slightly flexed posture     DRY NEEDLING: consent obtained and skin prepped with alcohol; standard procedure followed:   Not today: In supine: 3\" B deep fibular homeostatic point (longitudinally, aiming towards plantar fascia)- near full penetration on L; 2/3 on R; 1\" B tibial, 2\" common fibular homeostatic points B; attempted saphenous homeostatic point but with increased tenderness, this was removed. Prone: 50 total: T3 to C3 with 1\" paravertebral points, also B greater occipital homeostatic points, B occipital area with 1\" needles; Lumbar T2-L4with 2\" needles; 3\" (3) bilat superior cluneals; 1\" B sural homeostatic points  left in situ 15 min     MFR in prone from occiput to plantar fascia; most discomfort in c-and upper thoracic spine; felt good at same time; tightest in HS bilat; crepitus R more than L plantar fascia;     Specific Instructions for next treatment: strength ex; DN, stretches         Treatment Charges: Mins Units   [x]  Modalities: HP 20 0   []  Ther Exercise     [x]  Manual Therapy 40 3   []  Ther Activities     []  Aquatics     []  Vasocompression     [x]  Other: stim 15 1   Total Treatment time 55 4       Assessment: [x] Progressing toward goals: feels benefit from MFR; no sensation of stim in LB, only calf with DN    [] No change. [] Other:    STG: (to be met in 6   treatments)  1. ? Pain: below 5-10/10 range  2. ? ROM: complete spine, cervical and LE stretches to improve overall flexibility  3. ? Strength: tolerate basic stability ex for spine  4. ? Function: modify activities to decrease stress to spine and feet  5. Independent with Home Exercise Program  6. Demonstrate Knowledge of fall prevention  7. +tender spots throughout body     LTG: (to be met in 12  treatments)  1. Pain below 4-8/10 pain levels  2. Improve flexibility of affected areas to WNL  3. At least 4/5 strength of spine and core for increased spinal support  4. Decreased feeling of tightness throughout musculoskeletal system  5. Function improved at least 10 percentage points per LEFI                    Patient goals: \"less pain\"    Pt.  Education:  [] Yes  [x] No  [] Reviewed Prior HEP/Ed  Method of Education: [] Verbal  [] Demo: DN  [] Written: fall prevention  Comprehension of Education:  [] Verbalizes understanding. [] Demonstrates understanding. [x] Needs review. [] Demonstrates/verbalizes HEP/Ed previously given. Plan: [x] Continue per plan of care.    [] Other:      Time In: 2:00 pm            Time Out: 3:25 pm    Electronically signed by:  Cristino Marinelli PT

## 2018-09-19 ENCOUNTER — HOSPITAL ENCOUNTER (OUTPATIENT)
Dept: PHYSICAL THERAPY | Facility: CLINIC | Age: 48
Setting detail: THERAPIES SERIES
Discharge: HOME | End: 2018-09-19
Payer: MEDICARE

## 2018-09-19 PROCEDURE — 97140 MANUAL THERAPY 1/> REGIONS: CPT

## 2018-09-19 NOTE — FLOWSHEET NOTE
throughout musculoskeletal system  5. Function improved at least 10 percentage points per LEFI                    Patient goals: \"less pain\"    Pt. Education:  [] Yes  [x] No  [] Reviewed Prior HEP/Ed  Method of Education: [] Verbal  [] Demo: DN  [] Written: fall prevention  Comprehension of Education:  [] Verbalizes understanding. [] Demonstrates understanding. [x] Needs review. [] Demonstrates/verbalizes HEP/Ed previously given. Plan: [x] Continue per plan of care.    [] Other:      Time In: 12:00 pm            Time Out: 1:20 pm    Electronically signed by:  Joann Sauceda, PT

## 2018-09-26 ENCOUNTER — HOSPITAL ENCOUNTER (OUTPATIENT)
Dept: PHYSICAL THERAPY | Facility: CLINIC | Age: 48
Setting detail: THERAPIES SERIES
Discharge: HOME | End: 2018-09-26
Payer: MEDICARE

## 2018-09-26 ENCOUNTER — OFFICE VISIT (OUTPATIENT)
Dept: PODIATRY | Age: 48
End: 2018-09-26
Payer: MEDICARE

## 2018-09-26 VITALS — RESPIRATION RATE: 16 BRPM | BODY MASS INDEX: 31.71 KG/M2 | HEIGHT: 67 IN | WEIGHT: 202 LBS

## 2018-09-26 DIAGNOSIS — M25.572 ARTHRALGIA OF BOTH FEET: Primary | ICD-10-CM

## 2018-09-26 DIAGNOSIS — M79.604 PAIN IN BOTH LOWER EXTREMITIES: ICD-10-CM

## 2018-09-26 DIAGNOSIS — R26.2 DIFFICULTY WALKING: ICD-10-CM

## 2018-09-26 DIAGNOSIS — M79.2 NEURALGIA: ICD-10-CM

## 2018-09-26 DIAGNOSIS — M79.605 PAIN IN BOTH LOWER EXTREMITIES: ICD-10-CM

## 2018-09-26 DIAGNOSIS — M25.571 ARTHRALGIA OF BOTH FEET: Primary | ICD-10-CM

## 2018-09-26 PROCEDURE — 97140 MANUAL THERAPY 1/> REGIONS: CPT

## 2018-09-26 PROCEDURE — 99213 OFFICE O/P EST LOW 20 MIN: CPT | Performed by: PODIATRIST

## 2018-09-26 PROCEDURE — G8428 CUR MEDS NOT DOCUMENT: HCPCS | Performed by: PODIATRIST

## 2018-09-26 PROCEDURE — G8417 CALC BMI ABV UP PARAM F/U: HCPCS | Performed by: PODIATRIST

## 2018-09-26 PROCEDURE — 1036F TOBACCO NON-USER: CPT | Performed by: PODIATRIST

## 2018-09-26 NOTE — FLOWSHEET NOTE
[] Darshana Alberta       Outpatient Physical        Therapy       [address]       Phone: [phone]       Fax: [phone] [x] [facility] at [address] [facility]       Phone: [phone]       Fax: [phone] [] [address]. [address] [place] [address] [facility]  [address] [place] [address]   Phone: [phone]   Fax:  [phone]     Physical Therapy Daily Treatment Note    Date:  2018  Patient Name:  Yudy Alvarado    :  1970  MRN: [id number]  Physician: Memo Rodriguez DPM (plantar fascitis)  Physician: Laury Sexton MD (back)   Insurance: [employer]  (finished 8 visits earlier this year)  Medical Diagnosis: B plantar fascitis; Pain B LEs; difficulty walking; chronic L sided back pain                      Rehab Codes: M54.9; M79.7; M79.671; M79.672; R29.3; R26.2; M62.81; R53.82  Onset date: 2008               Next Dr's appt. : 18 (podiatry)    Visit# / total visits:   Cancels/No Shows: 0    Subjective:    Pain:  [x] Yes  [] No Location: upper back, B plantar fascia Pain Rating: (0-10 scale) 10/10  Pain altered Tx:  [x] No  [] Yes  Action:  Comments: reports effects of Botox pain lasted about 1 week;     Objective:  Modalities: stim R L4/5 to sural and R C6 to T3, 15 min; not today: HP back and neck, plus cervical wrapped around B feet in supine at end of session, 15 min  Precautions: easily fatigues; easily sore  Exercises: bolded  Exercise Reps/ Time Weight/ Level Comments   Mid thoracic stretch x       Cat/camel on counter x       Pf stretch on step x                 prone      Quad stretch x CR Started 18   Hip IR./ER stretch x CR \"         supine      HS stretch x CR \"   Hip abd stretch x CR \"   Hip add stretch x CR \"   UT stretch s way x CR \"   Other:   Not today: Therapeutic activities: instructed in using inverted dish pan in sink for dishes to avoid slightly flexed posture     DRY NEEDLING: consent obtained and skin prepped with alcohol; standard procedure followed:   Not today: In supine: 3\" B deep fibular homeostatic point (longitudinally, aiming towards plantar fascia)- near full penetration on L; 2/3 on R; 1\" B tibial, 2\" common fibular homeostatic points B; attempted saphenous homeostatic point but with increased tenderness, this was removed. Prone: : T3 to C3 with 1\" paravertebral points, also B greater occipital homeostatic points, B occipital area with 1\" needles; Lumbar [id number] 2\" needles on R and only L2 and thoracic on L; R L5 with 3\" 1\" B sural and B 2\" common fibular homeostatic points  left in situ 15 min     MFR in prone from occiput to plantar fascia; most discomfort in c-andL quadratus lumborum;     Positional release: instructed to laterally flex to L and hold for 1 to 1.5 min and passively correct to see if this will  Positively effect lumbar region;    Specific Instructions for next treatment: strength ex; DN, stretches         Treatment Charges: Mins Units   []  Modalities: HP     []  Ther Exercise     [x]  Manual Therapy 40 3   []  Ther Activities     []  Aquatics     []  Vasocompression     []  Other: stim     Total Treatment time 40 3       Assessment: [] Progressing toward goals:     [] No change. [x] Other: pt with chronic pain and spasm today in lumbar area; neck is somewhat recovered from last session; pt unable to tolerate ex this date (reviewed verbally previous exercises); pt more sensitive to DN this date that she normally would not be so treatment was modified    STG: (to be met in 6   treatments) NOT MET  1. ? Pain: below 5-10/10 range  2. ? ROM: complete spine, cervical and LE stretches to improve overall flexibility  3. ? Strength: tolerate basic stability ex for spine  4. ? Function: modify activities to decrease stress to spine and feet  5. Independent with Home Exercise Program  6.  Demonstrate Knowledge of fall prevention  7. +tender spots throughout body     LTG: (to be met in 12  treatments)  1. Pain below 4-8/10 pain levels  2. Improve flexibility of affected areas to WNL  3. At least 4/5 strength of spine and core for increased spinal support  4. Decreased feeling of tightness throughout musculoskeletal system  5. Function improved at least 10 percentage points per LEFI                    Patient goals: \"less pain\"    Pt. Education:  [x] Yes  [] No  [] Reviewed Prior HEP/Ed  Method of Education: [x] Verbal  [x] Demo: self positional release technique  [] Written: fall prevention  Comprehension of Education:  [x] Verbalizes understanding. [] Demonstrates understanding. [x] Needs review. [] Demonstrates/verbalizes HEP/Ed previously given. Plan: [x] Continue per plan of care.    [] Other:      Time In: 11:55 am           Time Out:12:50  pm    Electronically signed by:  Jacob Raza, PT

## 2018-09-26 NOTE — PROGRESS NOTES
[facility] PHYSICIANS  [facility] [address]  [address]  [address]  Dept: [phone]  Dept Fax: [phone]    RETURN PATIENT PROGRESS NOTE  Date of patient's visit: 9/26/2018  Patient's Name:  Henry Beebe YOB: 1970            Patient Care Team:  Park Keith MD as PCP - General (Family Medicine)  Janett Melo DPM as Physician (Podiatry)       Henry Beebe 57 y.o. female that presents for follow-up of plantar fasicitis  Chief Complaint   Patient presents with    Foot Pain       Symptoms began years ago and are unchanged . Patient relates pain is Present. Pain is rated 10 out of 10 and is described as constant. Treatments prior to today's visit include: exercises, icing and has been cast for custom orthotics which she is picking up today and. She also relates to testing +GENET. She will be f/u with Rheumatologist. Currently denies F/C/N/V. Allergies   Allergen Reactions    Azithromycin Hives    Cefuroxime Axetil Hives    Penicillins Hives    Sulfa Antibiotics Hives     Other reaction(s): Unknown  Other reaction(s): Unknown  Other reaction(s): Unknown  Other reaction(s): Allergy: HIVES;  Other reaction(s): hives  Other reaction(s): Unknown  Other reaction(s): Allergy: HIVES;    Bactrim [Sulfamethoxazole-Trimethoprim]     Dicyclomine     Lyrica [Pregabalin]     Minocycline     Penicillin G Other (See Comments)    Silodosin Other (See Comments)       Past Medical History:   Diagnosis Date    Anxiety     Depression     Fibromyalgia        Prior to Admission medications    Medication Sig Start Date End Date Taking? Authorizing Provider   clonazePAM (KLONOPIN) 0.5 MG tablet Take by mouth 2 times daily. . 7/18/18   Historical Provider, MD   traMADol (ULTRAM) 50 MG tablet Take 50 mg by mouth as needed for Pain. Duane Bourdon     Historical Provider, MD   Ketorolac Tromethamine (TORADOL ORAL PO) Take by mouth    Historical Provider, MD   naproxen sodium (ANAPROX) 550 MG tablet  6/27/18   Historical Provider, MD   Plecanatide (TRULANCE PO) Take by mouth    Historical Provider, MD   Brimonidine Tartrate (MIRVASO) 0.33 % GEL Apply once daily 5/14/18   Rubina Salter MD   ondansetron (ZOFRAN) 4 MG tablet Take 1 tablet by mouth daily as needed for Nausea or Vomiting 2/26/18   Rubina Salter MD   sertraline (ZOLOFT) 25 MG tablet Take 0.5 tablets by mouth daily 2/26/18   Rubina Salter MD   Levonorgest-Eth Estrad 91-Day 0.1-0.02 & 0.01 MG TABS Take 1 tablet by mouth 12/22/17   Historical Provider, MD   omeprazole (PRILOSEC) 20 MG delayed release capsule Take 1 capsule by mouth daily 1/26/18   Rubina Salter MD       Review of Systems  Review of Systems - History obtained from chart review and the patient  General ROS: negative for - chills, fatigue, fever, night sweats or weight gain  Constitutional: Negative for chills, diaphoresis, fatigue, fever and unexpected weight change. Musculoskeletal: Positive for arthralgias, gait problem and joint swelling. Neurological ROS: negative for - behavioral changes, confusion, headaches or seizures. Negative for weakness and numbness. Dermatological ROS: negative for - mole changes, rash  Cardiovascular: Negative for leg swelling. Gastrointestinal: Negative for constipation, diarrhea, nausea and vomiting. Lower Extremity Physical Examination:     Vitals:   Vitals:    09/26/18 0937   Resp: 16     General: AAO x 3 in NAD. Dermatologic Exam:  Skin lesion/ulceration Absent . Skin No rashes or nodules noted. .       Musculoskeletal:     1st MPJ ROM decreased, Bilateral.  Muscle strength 5/5, Bilateral.  Pain present upon palpation of plantar heel, marion. .  Medial longitudinal arch, Bilateral WNL.   Ankle ROM painful ,Bilateral.    Dorsally contracted digits absent digits 1-5 Bilateral.     Vascular: DP and PT pulses palpable 2/4, Bilateral.  CFT <3 seconds, Bilateral.  Hair growth present to the level of the digits,

## 2018-10-03 ENCOUNTER — TELEPHONE (OUTPATIENT)
Dept: FAMILY MEDICINE CLINIC | Age: 48
End: 2018-10-03

## 2018-10-03 DIAGNOSIS — Z77.120 MOLD EXPOSURE: Primary | ICD-10-CM

## 2018-10-11 ENCOUNTER — HOSPITAL ENCOUNTER (OUTPATIENT)
Dept: PHYSICAL THERAPY | Facility: CLINIC | Age: 48
Setting detail: THERAPIES SERIES
Discharge: HOME | End: 2018-10-11
Payer: MEDICARE

## 2018-10-11 PROCEDURE — 97140 MANUAL THERAPY 1/> REGIONS: CPT

## 2018-10-11 NOTE — FLOWSHEET NOTE
flexibility of affected areas to WNL  3. At least 4/5 strength of spine and core for increased spinal support  4. Decreased feeling of tightness throughout musculoskeletal system  5. Function improved at least 10 percentage points per LEFI                    Patient goals: \"less pain\"    Pt. Education:  [] Yes  [x] No  [] Reviewed Prior HEP/Ed  Method of Education: [] Verbal  [] Demo: self positional release technique  [] Written: fall prevention  Comprehension of Education:  [] Verbalizes understanding. [] Demonstrates understanding. [] Needs review. [] Demonstrates/verbalizes HEP/Ed previously given. Plan: [x] Continue per plan of care.    [] Other:      Time In: 11:00 am           Time Out:12:00  pm    Electronically signed by:  Marie Manzano, PT

## 2018-10-18 ENCOUNTER — HOSPITAL ENCOUNTER (OUTPATIENT)
Dept: PHYSICAL THERAPY | Facility: CLINIC | Age: 48
Setting detail: THERAPIES SERIES
Discharge: HOME | End: 2018-10-18
Payer: MEDICARE

## 2018-10-18 ENCOUNTER — OFFICE VISIT (OUTPATIENT)
Dept: INFECTIOUS DISEASES | Age: 48
End: 2018-10-18
Payer: MEDICARE

## 2018-10-18 VITALS
RESPIRATION RATE: 16 BRPM | BODY MASS INDEX: 31.52 KG/M2 | OXYGEN SATURATION: 97 % | DIASTOLIC BLOOD PRESSURE: 88 MMHG | HEIGHT: 68 IN | WEIGHT: 208 LBS | SYSTOLIC BLOOD PRESSURE: 146 MMHG | HEART RATE: 94 BPM

## 2018-10-18 DIAGNOSIS — G89.29 CHRONIC LEFT-SIDED LOW BACK PAIN WITH LEFT-SIDED SCIATICA: Primary | ICD-10-CM

## 2018-10-18 DIAGNOSIS — Z88.1 ALLERGY TO MULTIPLE ANTIBIOTICS: ICD-10-CM

## 2018-10-18 DIAGNOSIS — G47.33 OSA (OBSTRUCTIVE SLEEP APNEA): ICD-10-CM

## 2018-10-18 DIAGNOSIS — J45.20 MILD INTERMITTENT ASTHMA WITHOUT COMPLICATION: ICD-10-CM

## 2018-10-18 DIAGNOSIS — I10 ESSENTIAL HYPERTENSION: ICD-10-CM

## 2018-10-18 DIAGNOSIS — K58.0 IRRITABLE BOWEL SYNDROME WITH DIARRHEA: ICD-10-CM

## 2018-10-18 DIAGNOSIS — K21.9 GASTROESOPHAGEAL REFLUX DISEASE WITHOUT ESOPHAGITIS: ICD-10-CM

## 2018-10-18 DIAGNOSIS — R41.3 MEMORY DEFICIT: ICD-10-CM

## 2018-10-18 DIAGNOSIS — F41.9 ANXIETY AND DEPRESSION: ICD-10-CM

## 2018-10-18 DIAGNOSIS — M79.7 FIBROMYALGIA: ICD-10-CM

## 2018-10-18 DIAGNOSIS — F32.A ANXIETY AND DEPRESSION: ICD-10-CM

## 2018-10-18 DIAGNOSIS — M54.42 CHRONIC LEFT-SIDED LOW BACK PAIN WITH LEFT-SIDED SCIATICA: Primary | ICD-10-CM

## 2018-10-18 LAB — IGE: NORMAL

## 2018-10-18 PROCEDURE — G8427 DOCREV CUR MEDS BY ELIG CLIN: HCPCS | Performed by: INTERNAL MEDICINE

## 2018-10-18 PROCEDURE — 99205 OFFICE O/P NEW HI 60 MIN: CPT | Performed by: INTERNAL MEDICINE

## 2018-10-18 PROCEDURE — G8484 FLU IMMUNIZE NO ADMIN: HCPCS | Performed by: INTERNAL MEDICINE

## 2018-10-18 PROCEDURE — G8417 CALC BMI ABV UP PARAM F/U: HCPCS | Performed by: INTERNAL MEDICINE

## 2018-10-18 PROCEDURE — 1036F TOBACCO NON-USER: CPT | Performed by: INTERNAL MEDICINE

## 2018-10-18 RX ORDER — METHYLPHENIDATE HYDROCHLORIDE 5 MG/1
5 TABLET ORAL 2 TIMES DAILY
COMMUNITY

## 2018-10-18 RX ORDER — HYDROXYZINE HYDROCHLORIDE 10 MG/1
10 TABLET, FILM COATED ORAL 3 TIMES DAILY PRN
COMMUNITY
End: 2019-02-25

## 2018-10-18 NOTE — PROGRESS NOTES
and possible narcolepsy    Psychiatry for depression and anxiety    She has recently seen Rheumatology as well     She also reports that she has been exposed to mold in her home and is very concerned that she may have been exposed to mycotoxins. She developed the first signs of illness approximately 19 years ago, 1 year after moving into her current home. She has never had the home tested for mold, but reports that her basement is damp and that there is mold behind furniture and wall decor. She has attempted to decrease the amount of moisture in her basement over the years, but has never had it inspected for mold. She bleached and repainted the walls, but has not replaced the carpet or linoleum floors. She states that she cannot afford to leave the home for a period of time to see if her symptoms improve, nor can she afford to move. She has not changed the daniel because of a fear of stirring up the mold and aggravating her current symptoms. Over the past 19 years she reports hair loss, coughing (from post nasal drip) and significant phlegm production. She also reports severe constant joint pain, anxiety, depression, brain fog, poor vision - even though her recent eye exam showed 20/20 vision. She reports severe exhaustion, insomnia, memory loss, extreme pain with staying in any one position for more than a few minutes, SOB with any type of exertion, excessive sweating and weight gain. She has not been able to find any relief of the symptoms. She was recently recommended to use CBD oil for pain control.      Pt reports previous testing for Lupus and Lymes Disease, both negative in 2016    Most recent tests:  9/17/18  Anticardiolipin IgA - 5.3   IgM - 3.8   IgG - <1.6  CRP 1.0  Ds DNA 13  Anti-smith ab IgG <0.2  Rheumatoid factor <10  CCP IgG Antibodies <0.5  C3 Compliment 186  C4 Compliment 39  Dilute Viper Venom  Negative  ESR 14     Pts physical exam is within normal limits    DISCUSSION:  Pt with of sinuses. Mouth/throat: mucosa pink and moist. No lesions. Dentition in good repair. Neck:Supple, without lymphadenopathy. Thyroid normal, No bruits. Pulmonary/Chest: Clear to auscultation, without wheezes, rales, or rhonchi. No dullness to percussion. Cardiovascular: Regular rate and rhythm without murmurs, rubs, or gallops. Abdomen: Soft, non tender. Bowel sounds normal. No organomegaly  All four Extremities: No cyanosis, clubbing, edema, or effusions. Neurologic: No gross sensory or motor deficits. Skin: Warm and dry with good turgor. No signs of peripheral arterial or venous insufficiency. Medical Decision Making:   I have independently reviewed/ordered the following labs:    CBC with Differential:  Lab Results   Component Value Date    WBC 8.0 02/05/2018    HGB 15.1 02/15/2018    HGB 15.6 02/05/2018    HCT 46.1 02/05/2018    PLT See Reflexed IPF Result 02/05/2018    LYMPHOPCT 29 02/05/2018    MONOPCT 6 02/05/2018     BMP:   Lab Results   Component Value Date     02/26/2018     02/22/2018    K 4.1 02/26/2018    K 3.8 02/22/2018     02/26/2018     02/22/2018    CO2 18 02/26/2018    CO2 22 02/22/2018    BUN 17 02/05/2018    CREATININE 0.78 02/26/2018    CREATININE 0.84 02/05/2018    MG 1.8 02/16/2018     Hepatic Function Panel:  Lab Results   Component Value Date    PROT 7.7 02/05/2018    LABALBU 4.2 02/05/2018    BILITOT 0.59 02/05/2018    ALKPHOS 59 02/05/2018    ALT 24 02/05/2018    AST 29 02/05/2018     Lab Results   Component Value Date    RBC 5.41 02/05/2018    WBC 8.0 02/05/2018    TURBIDITY CLEAR 01/26/2018     Lab Results   Component Value Date    CREATININE 0.78 02/26/2018    GLUCOSE 92 02/05/2018       Thank you for allowing us to participate in the care of this patient. Please call with questions.     Terry Huynh MD  Pager: [phone] - Office: [phone]

## 2018-10-18 NOTE — FLOWSHEET NOTE
[] [address]  [address]    [phone]  F: [phone] [x] [address]  [facility]   [address]  P: [phone]  F: [phone] [] [place]  [address] [facility]  P: [phone]  F: [phone] [] [address] [facility]   [address]   [place]: [phone]  F: [phone] [] [address]  [place]: [phone]   F: [phone]      Physical Therapy Cancel/No Show note    Date: 10/18/2018  Patient: Angelic Brody  : 1970  MRN: [id number]    Cancels/No Shows to date: 1 cx/1 ns     For today's appointment patient:  [x]  Cancelled  []  Rescheduled appointment  []  No-show     Reason given by patient:  [x]  Patient ill  []  Conflicting appointment  []  No transportation    []  Conflict with work  []  No reason given  []  Weather related  []  Other:      Comments:      [x]  Next appointment was confirmed 10/22/18    Electronically signed by: Rashaun Diaz PT

## 2018-10-18 NOTE — LETTER
[facility] [address] [place] [address]  Phone: [phone]  Fax: [phone]    PCP: Jessica Trotter MD   CC providers:  Jessica Trotter MD  [facility] [address] [facility] [address]  [address] [facility], MD  [address] [facility]  [address]  [place] [address]  VIA [address] [facility],[address], [facility] [address]  [address] [facility], MD  [address] [place] [address] [address] [facility]  [address] [facility] [address]       October 18, 2018       Patient: Roderick May   MR Number: [id number]   YOB: 1970   Date of Visit: 10/18/2018     Dear Swathi Jeong: Thank you for allowing me to see your patient Ms. Roderick May. Below are the relevant portions of my assessment and plan of care. [facility] - Initial Office Consult Note  Today's Date and Time: 10/18/2018, 12:03 PM    Diagnostic Impression :     1. Chronic left-sided low back pain with left-sided sciatica    2. Chronic migraine    3. Anxiety and depression    4. Fibromyalgia    5. Essential hypertension    6. Memory deficit    7. Gastroesophageal reflux disease without esophagitis    8. MARIBEL (obstructive sleep apnea)    9. Irritable bowel syndrome with diarrhea    10. Mild intermittent asthma without complication    11. Allergy to multiple antibiotics        Recommendations ·   IgE serum level  · Allergen Fungi and Mold IgE  · Aspergillus antibodies  · Have home evaluated for presence of molds  · Return to office in 4 weeks    Chief complaint/reason for consultation:     Chief Complaint   Patient presents with    Chronic Pain       History of Present Illness:   Roderick May is a 57y.o.-year-old  female who was initially evaluated on 10/18/2018.  Patient seen at the request of Dr. Benny Lloyd  COCCYX REMOVAL         Medications:     Current Outpatient Prescriptions:     clonazePAM (KLONOPIN) 0.5 MG tablet, Take by mouth 2 times daily. ., Disp: , Rfl:     traMADol (ULTRAM) 50 MG tablet, Take 50 mg by mouth as needed for Pain. ., Disp: , Rfl:     Ketorolac Tromethamine (TORADOL ORAL PO), Take by mouth, Disp: , Rfl:     naproxen sodium (ANAPROX) 550 MG tablet, , Disp: , Rfl: 0    Plecanatide (TRULANCE PO), Take by mouth, Disp: , Rfl:     Brimonidine Tartrate (MIRVASO) 0.33 % GEL, Apply once daily, Disp: 30 g, Rfl: 1    ondansetron (ZOFRAN) 4 MG tablet, Take 1 tablet by mouth daily as needed for Nausea or Vomiting, Disp: 30 tablet, Rfl: 0    sertraline (ZOLOFT) 25 MG tablet, Take 0.5 tablets by mouth daily, Disp: 1 tablet, Rfl: 0    Levonorgest-Eth Estrad 91-Day 0.1-0.02 & 0.01 MG TABS, Take 1 tablet by mouth, Disp: , Rfl:     omeprazole (PRILOSEC) 20 MG delayed release capsule, Take 1 capsule by mouth daily, Disp: 90 capsule, Rfl: 0     Social History:     Social History     Social History    Marital status:      Spouse name: N/A    Number of children: N/A    Years of education: N/A     Occupational History    Not on file. Social History Main Topics    Smoking status: Never Smoker    Smokeless tobacco: Never Used    Alcohol use Yes      Comment: occasional    Drug use: No    Sexual activity: Not Currently     Other Topics Concern    Not on file     Social History Narrative    No narrative on file       Family History:     Family History   Problem Relation Age of Onset    High Blood Pressure Mother     Diabetes Father     Breast Cancer Maternal Grandmother         Allergies:   Azithromycin; Cefuroxime axetil; Penicillins; Sulfa antibiotics; Bactrim [sulfamethoxazole-trimethoprim]; Dicyclomine; Lyrica [pregabalin]; Minocycline; Penicillin g; and Silodosin     Review of Systems:   Constitutional: No fevers or chills.  No systemic complaints  Head: No headaches Eyes: No double vision or blurry vision. ENT: No sore throat or runny nose. . No hearing loss, tinnitus or vertigo. Cardiovascular: No chest pain or palpitations. No shortness of breath. No CADENA  Lung: No shortness of breath or cough. No sputum production  Abdomen: No nausea, vomiting, diarrhea, or abdominal pain. .  Genitourinary: No increased urinary frequency, or dysuria. No hematuria. No suprapubic or CVA pain  Musculoskeletal: No muscle aches or pains. No joint effusions, swelling or deformities  Hematologic: No bleeding or bruising. Neurologic: No headache, weakness, numbness, or tingling. Physical Examination :   There were no vitals taken for this visit. General Appearance: Awake, alert, and in no apparent distress  Head:  Normocephalic, no trauma  Eyes: Pupils equal, round, reactive, to light and accommodation; extraocular movements intact; sclera anicteric; conjunctivae pink. No embolic phenomena. ENT: Oropharynx clear, without erythema, exudate, or thrush. No tenderness of sinuses. Mouth/throat: mucosa pink and moist. No lesions. Dentition in good repair. Neck:Supple, without lymphadenopathy. Thyroid normal, No bruits. Pulmonary/Chest: Clear to auscultation, without wheezes, rales, or rhonchi. No dullness to percussion. Cardiovascular: Regular rate and rhythm without murmurs, rubs, or gallops. Abdomen: Soft, non tender. Bowel sounds normal. No organomegaly  All four Extremities: No cyanosis, clubbing, edema, or effusions. Neurologic: No gross sensory or motor deficits. Skin: Warm and dry with good turgor. No signs of peripheral arterial or venous insufficiency.     Medical Decision Making:   I have independently reviewed/ordered the following labs:    CBC with Differential:  Lab Results   Component Value Date    WBC 8.0 02/05/2018    HGB 15.1 02/15/2018    HGB 15.6 02/05/2018    HCT 46.1 02/05/2018    PLT See Reflexed IPF Result 02/05/2018    LYMPHOPCT 29 02/05/2018    MONOPCT 6 02/05/2018

## 2018-10-25 ENCOUNTER — HOSPITAL ENCOUNTER (OUTPATIENT)
Dept: PHYSICAL THERAPY | Facility: CLINIC | Age: 48
Setting detail: THERAPIES SERIES
Discharge: HOME | End: 2018-10-25
Payer: MEDICARE

## 2018-10-25 NOTE — FLOWSHEET NOTE
[] [address]  [address]    [phone]  F: [phone] [x] [address]  [facility]   [address]  P: [phone]  F: [phone] [] [place]  [address] [facility]  P: [phone]  F: [phone] [] [address] [facility] [address]   [place]: [phone]  F: [phone] [] [place] [id number] [facility] [address]  [place]: [phone]   F: [phone]      Physical Therapy Cancel/No Show note    Date: 10/25/2018  Patient: Adela Lanes  : 1970  MRN: [id number]    Cancels/No Shows to date: 2 cx/1 ns    For today's appointment patient:  [x]  Cancelled  []  Rescheduled appointment  []  No-show     Reason given by patient:  [x]  Patient ill: has kidney stones; pt to call back when this is resolved  []  Conflicting appointment  []  No transportation    []  Conflict with work  []  No reason given  []  Weather related  []  Other:      Comments:      []  Next appointment was confirmed    Electronically signed by: Jami Krause PT

## 2018-10-31 ENCOUNTER — OFFICE VISIT (OUTPATIENT)
Dept: PODIATRY | Age: 48
End: 2018-10-31
Payer: MEDICARE

## 2018-10-31 VITALS — RESPIRATION RATE: 16 BRPM | HEIGHT: 67 IN | WEIGHT: 202 LBS | BODY MASS INDEX: 31.71 KG/M2

## 2018-10-31 DIAGNOSIS — M77.41 METATARSALGIA OF BOTH FEET: ICD-10-CM

## 2018-10-31 DIAGNOSIS — M77.42 METATARSALGIA OF BOTH FEET: ICD-10-CM

## 2018-10-31 DIAGNOSIS — M79.604 PAIN IN BOTH LOWER EXTREMITIES: ICD-10-CM

## 2018-10-31 DIAGNOSIS — M79.2 NEURITIS: Primary | ICD-10-CM

## 2018-10-31 DIAGNOSIS — M79.605 PAIN IN BOTH LOWER EXTREMITIES: ICD-10-CM

## 2018-10-31 DIAGNOSIS — R26.2 DIFFICULTY WALKING: ICD-10-CM

## 2018-10-31 PROCEDURE — 1036F TOBACCO NON-USER: CPT | Performed by: PODIATRIST

## 2018-10-31 PROCEDURE — G8484 FLU IMMUNIZE NO ADMIN: HCPCS | Performed by: PODIATRIST

## 2018-10-31 PROCEDURE — 99213 OFFICE O/P EST LOW 20 MIN: CPT | Performed by: PODIATRIST

## 2018-10-31 PROCEDURE — G8417 CALC BMI ABV UP PARAM F/U: HCPCS | Performed by: PODIATRIST

## 2018-10-31 PROCEDURE — G8427 DOCREV CUR MEDS BY ELIG CLIN: HCPCS | Performed by: PODIATRIST

## 2018-10-31 RX ORDER — LANOLIN ALCOHOL/MO/W.PET/CERES
1 CREAM (GRAM) TOPICAL DAILY
Qty: 30 TABLET | Refills: 3 | Status: SHIPPED | OUTPATIENT
Start: 2018-10-31

## 2018-10-31 NOTE — PROGRESS NOTES
[facility] PHYSICIANS  [facility] [address]  [address] the [facility]   Dept: [phone]  Dept Fax: [phone]    RETURN PATIENT PROGRESS NOTE  Date of patient's visit: 10/31/2018  Patient's Name:  Roderick May YOB: 1970            Patient Care Team:  Jessica Trotter MD as PCP - General (Family Medicine)  Manisha Sutton DPM as Physician (Podiatry)       [facility] 57 y.o. female that presents for follow-up of plantar fasicitis  Chief Complaint   Patient presents with    Foot Pain       Symptoms began years ago and are unchanged . Patient relates pain is Present. Pain is rated 10 out of 10 and is described as constant. Treatments prior to today's visit include: exercises, icing and has been wearing custom orthotics which she is making her shoes too tight. She also relates to testing +GENET. She will be f/u with Rheumatologist. Currently denies F/C/N/V. Allergies   Allergen Reactions    Azithromycin Hives    Cefuroxime Axetil Hives    Hydrocodone-Acetaminophen Hives    Penicillins Hives    Sulfa Antibiotics Hives     Other reaction(s): Unknown  Other reaction(s): Unknown  Other reaction(s): Unknown  Other reaction(s): Allergy: HIVES;  Other reaction(s): hives  Other reaction(s): Unknown  Other reaction(s): Allergy: HIVES;    Bactrim [Sulfamethoxazole-Trimethoprim]     Dicyclomine     Flomax  [Tamsulosin Hcl] Hives    Lyrica [Pregabalin]     Minocycline     Penicillin G Other (See Comments)    Silodosin Other (See Comments)       Past Medical History:   Diagnosis Date    Anxiety     Depression     Fibromyalgia        Prior to Admission medications    Medication Sig Start Date End Date Taking? Authorizing Provider   Biotin 300 MCG TABS Take 1 tablet by mouth daily 10/31/18  Yes Manisha Sutton DPM   methylphenidate (RITALIN) 5 MG tablet Take 5 mg by mouth 2 times daily. [place] [address]     Historical Provider, MD   hydrOXYzine (ATARAX) 10 MG tablet Take 10 mg by

## 2018-11-07 DIAGNOSIS — R41.3 MEMORY DEFICIT: ICD-10-CM

## 2018-11-07 DIAGNOSIS — K21.9 GASTROESOPHAGEAL REFLUX DISEASE WITHOUT ESOPHAGITIS: ICD-10-CM

## 2018-11-07 DIAGNOSIS — J45.20 MILD INTERMITTENT ASTHMA WITHOUT COMPLICATION: ICD-10-CM

## 2018-11-07 DIAGNOSIS — M54.42 CHRONIC LEFT-SIDED LOW BACK PAIN WITH LEFT-SIDED SCIATICA: ICD-10-CM

## 2018-11-07 DIAGNOSIS — G89.29 CHRONIC LEFT-SIDED LOW BACK PAIN WITH LEFT-SIDED SCIATICA: ICD-10-CM

## 2018-11-08 DIAGNOSIS — R41.3 MEMORY DEFICIT: ICD-10-CM

## 2018-11-08 DIAGNOSIS — J45.20 MILD INTERMITTENT ASTHMA WITHOUT COMPLICATION: ICD-10-CM

## 2018-11-08 DIAGNOSIS — M54.42 CHRONIC LEFT-SIDED LOW BACK PAIN WITH LEFT-SIDED SCIATICA: ICD-10-CM

## 2018-11-08 DIAGNOSIS — K21.9 GASTROESOPHAGEAL REFLUX DISEASE WITHOUT ESOPHAGITIS: ICD-10-CM

## 2018-11-08 DIAGNOSIS — G89.29 CHRONIC LEFT-SIDED LOW BACK PAIN WITH LEFT-SIDED SCIATICA: ICD-10-CM

## 2018-11-15 ENCOUNTER — OFFICE VISIT (OUTPATIENT)
Dept: INFECTIOUS DISEASES | Age: 48
End: 2018-11-15
Payer: MEDICARE

## 2018-11-15 VITALS
HEIGHT: 68 IN | SYSTOLIC BLOOD PRESSURE: 146 MMHG | RESPIRATION RATE: 16 BRPM | TEMPERATURE: 98 F | WEIGHT: 206 LBS | HEART RATE: 110 BPM | DIASTOLIC BLOOD PRESSURE: 90 MMHG | BODY MASS INDEX: 31.22 KG/M2 | OXYGEN SATURATION: 98 %

## 2018-11-15 DIAGNOSIS — G47.33 OSA (OBSTRUCTIVE SLEEP APNEA): ICD-10-CM

## 2018-11-15 DIAGNOSIS — M54.42 CHRONIC LEFT-SIDED LOW BACK PAIN WITH LEFT-SIDED SCIATICA: Primary | ICD-10-CM

## 2018-11-15 DIAGNOSIS — K58.0 IRRITABLE BOWEL SYNDROME WITH DIARRHEA: ICD-10-CM

## 2018-11-15 DIAGNOSIS — F32.A ANXIETY AND DEPRESSION: ICD-10-CM

## 2018-11-15 DIAGNOSIS — M79.7 FIBROMYALGIA: ICD-10-CM

## 2018-11-15 DIAGNOSIS — F41.9 ANXIETY AND DEPRESSION: ICD-10-CM

## 2018-11-15 DIAGNOSIS — J45.20 MILD INTERMITTENT ASTHMA WITHOUT COMPLICATION: ICD-10-CM

## 2018-11-15 DIAGNOSIS — I10 ESSENTIAL HYPERTENSION: ICD-10-CM

## 2018-11-15 DIAGNOSIS — R41.3 MEMORY DEFICIT: ICD-10-CM

## 2018-11-15 DIAGNOSIS — G89.29 CHRONIC LEFT-SIDED LOW BACK PAIN WITH LEFT-SIDED SCIATICA: Primary | ICD-10-CM

## 2018-11-15 DIAGNOSIS — K21.9 GASTROESOPHAGEAL REFLUX DISEASE WITHOUT ESOPHAGITIS: ICD-10-CM

## 2018-11-15 PROCEDURE — 99215 OFFICE O/P EST HI 40 MIN: CPT | Performed by: INTERNAL MEDICINE

## 2018-11-15 PROCEDURE — G8427 DOCREV CUR MEDS BY ELIG CLIN: HCPCS | Performed by: INTERNAL MEDICINE

## 2018-11-15 PROCEDURE — G8484 FLU IMMUNIZE NO ADMIN: HCPCS | Performed by: INTERNAL MEDICINE

## 2018-11-15 PROCEDURE — 1036F TOBACCO NON-USER: CPT | Performed by: INTERNAL MEDICINE

## 2018-11-15 PROCEDURE — G8417 CALC BMI ABV UP PARAM F/U: HCPCS | Performed by: INTERNAL MEDICINE

## 2018-11-15 NOTE — LETTER
Pt presents for evaluation of chronic pain and memory loss. Pt has a long history of multiple medical complaints that she reports \"no one can figure out what is wrong with me\". She has been diagnosed with, and treated for Fibromyalgia, however none of the treatments have alleviated her pain.      She reports that she has seen nearly every medical specialist that there is, and that no one can tell her what is wrong with her.     Currently she is followed by:     Gastroenterology for IBS and abdominal pain with cramping and fecal urgency, however she is unable to defecate and often has feelings of incomplete defecation. She often experiences nausea as well. She has tried fiber supplementation, Xifaxan, Viberzi and is now on Trulance.     Podiatry for chronic bilateral foot arthralgia and neuralgia.      Neurology for her cervical dystonia and torticolis, and is receiving Botox injections.     Pulmonary for asthma and possible narcolepsy     Psychiatry for depression and anxiety     She has recently seen Rheumatology as well     She also reports that she has been exposed to mold in her home and is very concerned that she may have been exposed to mycotoxins. She developed the first signs of illness approximately 19 years ago, 1 year after moving into her current home.     She has never had the home tested for mold, but reports that her basement is damp and that there is mold behind furniture and wall decor. She has attempted to decrease the amount of moisture in her basement over the years, but has never had it inspected for mold. She bleached and repainted the walls, but has not replaced the carpet or linoleum floors.     She states that she cannot afford to leave the home for a period of time to see if her symptoms improve, nor can she afford to move.  She has not changed the daniel because of a fear of stirring up the mold and aggravating her current symptoms.    Abdomen: No nausea, vomiting, diarrhea, or abdominal pain. .  Genitourinary: No increased urinary frequency, or dysuria. No hematuria. No suprapubic or CVA pain  Musculoskeletal: Generalized muscle aches and pains. No joint effusions, swelling or deformities  Hematologic: No bleeding or bruising. Neurologic: No headache, weakness, numbness, or tingling. Physical Examination :   There were no vitals taken for this visit. General Appearance: Awake, alert, and in no apparent distress  Head:  Normocephalic, no trauma  Eyes: Pupils equal, round, reactive, to light and accommodation; extraocular movements intact; sclera anicteric; conjunctivae pink. No embolic phenomena. ENT: Oropharynx clear, without erythema, exudate, or thrush. No tenderness of sinuses. Mouth/throat: mucosa pink and moist. No lesions. Dentition in good repair. Neck:Supple, without lymphadenopathy. Thyroid normal, No bruits. Pulmonary/Chest: Clear to auscultation, without wheezes, rales, or rhonchi. No dullness to percussion. Cardiovascular: Regular rate and rhythm without murmurs, rubs, or gallops. Abdomen: Soft, non tender. Bowel sounds normal. No organomegaly  All four Extremities: No cyanosis, clubbing, edema, or effusions. Neurologic: No gross sensory or motor deficits. Skin: Warm and dry with good turgor. No signs of peripheral arterial or venous insufficiency.     Medical Decision Making:   I have independently reviewed/ordered the following labs:    CBC with Differential:   Lab Results   Component Value Date    WBC 8.0 02/05/2018    HGB 15.1 02/15/2018    HGB 15.6 02/05/2018    HCT 46.1 02/05/2018    PLT See Reflexed IPF Result 02/05/2018    LYMPHOPCT 29 02/05/2018    MONOPCT 6 02/05/2018     BMP:   Lab Results   Component Value Date     02/26/2018     02/22/2018    K 4.1 02/26/2018    K 3.8 02/22/2018     02/26/2018     02/22/2018    CO2 18 02/26/2018    CO2 22 02/22/2018    BUN 17 02/05/2018

## 2018-11-15 NOTE — PROGRESS NOTES
No cyanosis, clubbing, edema, or effusions. Neurologic: No gross sensory or motor deficits. Skin: Warm and dry with good turgor. No signs of peripheral arterial or venous insufficiency. Medical Decision Making:   I have independently reviewed/ordered the following labs:    CBC with Differential:   Lab Results   Component Value Date    WBC 8.0 02/05/2018    HGB 15.1 02/15/2018    HGB 15.6 02/05/2018    HCT 46.1 02/05/2018    PLT See Reflexed IPF Result 02/05/2018    LYMPHOPCT 29 02/05/2018    MONOPCT 6 02/05/2018     BMP:   Lab Results   Component Value Date     02/26/2018     02/22/2018    K 4.1 02/26/2018    K 3.8 02/22/2018     02/26/2018     02/22/2018    CO2 18 02/26/2018    CO2 22 02/22/2018    BUN 17 02/05/2018    CREATININE 0.78 02/26/2018    CREATININE 0.84 02/05/2018    MG 1.8 02/16/2018     Hepatic Function Panel:   Lab Results   Component Value Date    PROT 7.7 02/05/2018    LABALBU 4.2 02/05/2018    BILITOT 0.59 02/05/2018    ALKPHOS 59 02/05/2018    ALT 24 02/05/2018    AST 29 02/05/2018     Lab Results   Component Value Date    RBC 5.41 02/05/2018    WBC 8.0 02/05/2018    TURBIDITY CLEAR 01/26/2018     Lab Results   Component Value Date    CREATININE 0.78 02/26/2018    GLUCOSE 92 02/05/2018       Hospital Encounter on 10/18/2018  [address] [place] [address]\")' href=\"[email or web address]/\">10/18/2018     View Detailed Result Report  Allergen mold panel  10/18/2018\")' href=\"[id number] mold panel/\"><0.35   View Detailed Result Report  Allergen mold panel  10/18/2018\")' href=\"[id number] mold panel/\"><0.35   View Detailed Result Report  Allergen mold panel  10/18/2018\")' href=\"[id number] mold panel/\"><0.35   View Detailed Result Report  Allergen

## 2018-11-28 ENCOUNTER — HOSPITAL ENCOUNTER (OUTPATIENT)
Dept: PHYSICAL THERAPY | Facility: CLINIC | Age: 48
Setting detail: THERAPIES SERIES
Discharge: HOME | End: 2018-11-28
Payer: MEDICARE

## 2018-12-17 DIAGNOSIS — K64.9 HEMORRHOIDS, UNSPECIFIED HEMORRHOID TYPE: Primary | ICD-10-CM

## 2018-12-18 ENCOUNTER — IMMUNIZATION (OUTPATIENT)
Dept: FAMILY MEDICINE CLINIC | Age: 48
End: 2018-12-18
Payer: MEDICARE

## 2018-12-18 DIAGNOSIS — Z23 NEEDS FLU SHOT: Primary | ICD-10-CM

## 2018-12-18 PROCEDURE — 90686 IIV4 VACC NO PRSV 0.5 ML IM: CPT | Performed by: FAMILY MEDICINE

## 2018-12-18 PROCEDURE — 90471 IMMUNIZATION ADMIN: CPT | Performed by: FAMILY MEDICINE

## 2019-02-25 ENCOUNTER — HOSPITAL ENCOUNTER (OUTPATIENT)
Dept: PAIN MANAGEMENT | Age: 49
Discharge: HOME | End: 2019-02-25
Payer: MEDICARE

## 2019-02-25 VITALS
DIASTOLIC BLOOD PRESSURE: 85 MMHG | OXYGEN SATURATION: 98 % | WEIGHT: 215 LBS | HEART RATE: 86 BPM | SYSTOLIC BLOOD PRESSURE: 147 MMHG | RESPIRATION RATE: 16 BRPM | HEIGHT: 67 IN | TEMPERATURE: 98.2 F | BODY MASS INDEX: 33.74 KG/M2

## 2019-02-25 DIAGNOSIS — R41.3 MEMORY DEFICIT: ICD-10-CM

## 2019-02-25 DIAGNOSIS — M79.7 FIBROMYALGIA: Primary | ICD-10-CM

## 2019-02-25 DIAGNOSIS — F32.A ANXIETY AND DEPRESSION: ICD-10-CM

## 2019-02-25 DIAGNOSIS — K58.0 IRRITABLE BOWEL SYNDROME WITH DIARRHEA: ICD-10-CM

## 2019-02-25 DIAGNOSIS — F41.9 ANXIETY AND DEPRESSION: ICD-10-CM

## 2019-02-25 DIAGNOSIS — G47.9 SLEEP DISTURBANCES: ICD-10-CM

## 2019-02-25 PROCEDURE — 99244 OFF/OP CNSLTJ NEW/EST MOD 40: CPT | Performed by: PAIN MEDICINE

## 2019-02-25 PROCEDURE — 80307 DRUG TEST PRSMV CHEM ANLYZR: CPT

## 2019-02-25 PROCEDURE — 99204 OFFICE O/P NEW MOD 45 MIN: CPT

## 2019-02-25 RX ORDER — IPRATROPIUM BROMIDE 21 UG/1
SPRAY, METERED NASAL
Refills: 0 | COMMUNITY
Start: 2019-01-22

## 2019-02-25 RX ORDER — DULOXETIN HYDROCHLORIDE 60 MG/1
CAPSULE, DELAYED RELEASE ORAL
Refills: 0 | COMMUNITY
Start: 2019-02-23

## 2019-02-25 RX ORDER — ROPINIROLE 1 MG/1
TABLET, FILM COATED ORAL
COMMUNITY
Start: 2019-02-23 | End: 2019-07-23 | Stop reason: SDUPTHER

## 2019-02-25 RX ORDER — RANITIDINE 150 MG/1
CAPSULE ORAL
Refills: 1 | COMMUNITY
Start: 2019-02-15

## 2019-02-25 RX ORDER — GABAPENTIN 600 MG/1
TABLET ORAL
Refills: 0 | COMMUNITY
Start: 2019-02-03 | End: 2022-05-05

## 2019-02-25 RX ORDER — MONTELUKAST SODIUM 10 MG/1
TABLET ORAL
Refills: 0 | COMMUNITY
Start: 2019-02-14

## 2019-02-25 RX ORDER — GLYCOPYRROLATE 1 MG/1
TABLET ORAL
Refills: 0 | COMMUNITY
Start: 2019-02-14

## 2019-02-25 RX ORDER — METRONIDAZOLE 7.5 MG/G
GEL TOPICAL
Refills: 0 | COMMUNITY
Start: 2019-02-07

## 2019-02-25 RX ORDER — HYDROXYCHLOROQUINE SULFATE 200 MG/1
TABLET, FILM COATED ORAL
Refills: 0 | COMMUNITY
Start: 2019-02-11

## 2019-02-25 RX ORDER — BUDESONIDE 180 UG/1
AEROSOL, POWDER RESPIRATORY (INHALATION)
Refills: 0 | COMMUNITY
Start: 2019-01-28

## 2019-02-25 ASSESSMENT — ENCOUNTER SYMPTOMS
NAUSEA: 0
DIARRHEA: 1
EYE REDNESS: 0
PHOTOPHOBIA: 0
ABDOMINAL PAIN: 0
BACK PAIN: 1
SORE THROAT: 0
COUGH: 0
SHORTNESS OF BREATH: 0
CONSTIPATION: 1
VOMITING: 0

## 2019-02-25 ASSESSMENT — PAIN DESCRIPTION - DESCRIPTORS: DESCRIPTORS: ACHING;TENDER

## 2019-02-25 ASSESSMENT — PAIN DESCRIPTION - LOCATION: LOCATION: NECK

## 2019-02-25 ASSESSMENT — PAIN DESCRIPTION - PROGRESSION: CLINICAL_PROGRESSION: GRADUALLY WORSENING

## 2019-02-25 ASSESSMENT — PAIN - FUNCTIONAL ASSESSMENT: PAIN_FUNCTIONAL_ASSESSMENT: PREVENTS OR INTERFERES SOME ACTIVE ACTIVITIES AND ADLS

## 2019-02-25 ASSESSMENT — PAIN DESCRIPTION - PAIN TYPE: TYPE: CHRONIC PAIN

## 2019-02-25 ASSESSMENT — PAIN DESCRIPTION - FREQUENCY: FREQUENCY: CONTINUOUS

## 2019-02-25 ASSESSMENT — PAIN DESCRIPTION - ONSET: ONSET: ON-GOING

## 2019-02-25 ASSESSMENT — PAIN DESCRIPTION - ORIENTATION: ORIENTATION: RIGHT;LEFT

## 2019-02-28 LAB
6-ACETYLMORPHINE, UR: NOT DETECTED
7-AMINOCLONAZEPAM, URINE: NOT DETECTED
ALPHA-OH-ALPRAZ, URINE: NOT DETECTED
ALPRAZOLAM, URINE: NOT DETECTED
AMPHETAMINES, URINE: NOT DETECTED
BARBITURATES, URINE: NOT DETECTED
BENZOYLECGONINE, UR: NOT DETECTED
BUPRENORPHINE URINE: NOT DETECTED
CARISOPRODOL, UR: NOT DETECTED
CLONAZEPAM, URINE: NOT DETECTED
CODEINE, URINE: NOT DETECTED
CREATININE URINE: 173.4 MG/DL (ref 20–400)
DIAZEPAM, URINE: NOT DETECTED
DRUGS EXPECTED, UR: NORMAL
EER HI RES INTERP UR: NORMAL
ETHYL GLUCURONIDE UR: NOT DETECTED
FENTANYL URINE: NOT DETECTED
HYDROCODONE, URINE: NOT DETECTED
HYDROMORPHONE, URINE: NOT DETECTED
LORAZEPAM, URINE: NOT DETECTED
MARIJUANA METAB, UR: NOT DETECTED
MDA, UR: NOT DETECTED
MDEA, EVE, UR: NOT DETECTED
MDMA URINE: NOT DETECTED
MEPERIDINE METAB, UR: NOT DETECTED
METHADONE, URINE: NOT DETECTED
METHAMPHETAMINE, URINE: NOT DETECTED
METHYLPHENIDATE: PRESENT
MIDAZOLAM, URINE: NOT DETECTED
MORPHINE URINE: NOT DETECTED
NORBUPRENORPHINE, URINE: NOT DETECTED
NORDIAZEPAM, URINE: NOT DETECTED
NORFENTANYL, URINE: NOT DETECTED
NORHYDROCODONE, URINE: NOT DETECTED
NOROXYCODONE, URINE: NOT DETECTED
NOROXYMORPHONE, URINE: NOT DETECTED
OXAZEPAM, URINE: NOT DETECTED
OXYCODONE URINE: NOT DETECTED
OXYMORPHONE, URINE: NOT DETECTED
PAIN MANAGEMENT DRUG PANEL INTERP, URINE: NORMAL
PAIN MGT DRUG PANEL, HI RES, UR: NORMAL
PCP,URINE: NOT DETECTED
PHENTERMINE, UR: NOT DETECTED
PROPOXYPHENE, URINE: NOT DETECTED
TAPENTADOL, URINE: NOT DETECTED
TAPENTADOL-O-SULFATE, URINE: NOT DETECTED
TEMAZEPAM, URINE: NOT DETECTED
TRAMADOL, URINE: NOT DETECTED
ZOLPIDEM, URINE: NOT DETECTED

## 2019-03-04 ENCOUNTER — OFFICE VISIT (OUTPATIENT)
Dept: FAMILY MEDICINE CLINIC | Age: 49
End: 2019-03-04
Payer: MEDICARE

## 2019-03-04 VITALS
HEIGHT: 67 IN | WEIGHT: 218 LBS | RESPIRATION RATE: 16 BRPM | HEART RATE: 98 BPM | BODY MASS INDEX: 34.21 KG/M2 | DIASTOLIC BLOOD PRESSURE: 88 MMHG | SYSTOLIC BLOOD PRESSURE: 135 MMHG

## 2019-03-04 DIAGNOSIS — L30.9 DERMATITIS: Primary | ICD-10-CM

## 2019-03-04 DIAGNOSIS — J45.20 MILD INTERMITTENT ASTHMA WITHOUT COMPLICATION: ICD-10-CM

## 2019-03-04 DIAGNOSIS — I73.00 RAYNAUD'S DISEASE WITHOUT GANGRENE: ICD-10-CM

## 2019-03-04 PROCEDURE — G8482 FLU IMMUNIZE ORDER/ADMIN: HCPCS | Performed by: FAMILY MEDICINE

## 2019-03-04 PROCEDURE — G8417 CALC BMI ABV UP PARAM F/U: HCPCS | Performed by: FAMILY MEDICINE

## 2019-03-04 PROCEDURE — 99214 OFFICE O/P EST MOD 30 MIN: CPT | Performed by: FAMILY MEDICINE

## 2019-03-04 PROCEDURE — 1036F TOBACCO NON-USER: CPT | Performed by: FAMILY MEDICINE

## 2019-03-04 PROCEDURE — G8427 DOCREV CUR MEDS BY ELIG CLIN: HCPCS | Performed by: FAMILY MEDICINE

## 2019-03-04 RX ORDER — MICONAZOLE NITRATE 57 G
CREAM TOPICAL
Qty: 142 G | Refills: 11 | Status: SHIPPED | OUTPATIENT
Start: 2019-03-04

## 2019-03-04 ASSESSMENT — PATIENT HEALTH QUESTIONNAIRE - PHQ9
SUM OF ALL RESPONSES TO PHQ9 QUESTIONS 1 & 2: 0
2. FEELING DOWN, DEPRESSED OR HOPELESS: 0
1. LITTLE INTEREST OR PLEASURE IN DOING THINGS: 0
SUM OF ALL RESPONSES TO PHQ QUESTIONS 1-9: 0
SUM OF ALL RESPONSES TO PHQ QUESTIONS 1-9: 0

## 2019-04-17 ENCOUNTER — TELEPHONE (OUTPATIENT)
Dept: FAMILY MEDICINE CLINIC | Age: 49
End: 2019-04-17

## 2019-04-17 DIAGNOSIS — G56.03 CARPAL TUNNEL SYNDROME, BILATERAL: Primary | ICD-10-CM

## 2019-04-17 NOTE — TELEPHONE ENCOUNTER
Pt called states that she is still having the hand pain and tingling and cannot hold things. States she drops things all the time. Pt aslo states she cannot even get into neurology for another month. Wanted to know if you can order an EMG for her to get the ball rolling. When she called neuro they informed her that she would need an EMG. Please advise.

## 2019-04-29 DIAGNOSIS — G56.03 CARPAL TUNNEL SYNDROME, BILATERAL: ICD-10-CM

## 2019-05-07 ENCOUNTER — OFFICE VISIT (OUTPATIENT)
Dept: PODIATRY | Age: 49
End: 2019-05-07
Payer: MEDICARE

## 2019-05-07 VITALS — BODY MASS INDEX: 31.83 KG/M2 | HEIGHT: 68 IN | WEIGHT: 210 LBS

## 2019-05-07 DIAGNOSIS — R26.2 DIFFICULTY WALKING: ICD-10-CM

## 2019-05-07 DIAGNOSIS — M79.605 PAIN IN BOTH LOWER EXTREMITIES: ICD-10-CM

## 2019-05-07 DIAGNOSIS — M79.2 NEURITIS: Primary | ICD-10-CM

## 2019-05-07 DIAGNOSIS — M79.604 PAIN IN BOTH LOWER EXTREMITIES: ICD-10-CM

## 2019-05-07 DIAGNOSIS — M79.2 NEURALGIA: ICD-10-CM

## 2019-05-07 PROBLEM — J38.2 SINGERS' NODES: Status: ACTIVE | Noted: 2017-04-27

## 2019-05-07 PROBLEM — N20.0 KIDNEY STONES: Status: ACTIVE | Noted: 2017-04-03

## 2019-05-07 PROBLEM — F32.A DEPRESSION: Status: ACTIVE | Noted: 2017-04-27

## 2019-05-07 PROBLEM — J30.9 ATOPIC RHINITIS: Status: ACTIVE | Noted: 2017-04-27

## 2019-05-07 PROBLEM — R10.31 RIGHT LOWER QUADRANT ABDOMINAL PAIN: Status: ACTIVE | Noted: 2017-04-03

## 2019-05-07 PROCEDURE — G8427 DOCREV CUR MEDS BY ELIG CLIN: HCPCS | Performed by: PODIATRIST

## 2019-05-07 PROCEDURE — G8417 CALC BMI ABV UP PARAM F/U: HCPCS | Performed by: PODIATRIST

## 2019-05-07 PROCEDURE — 99213 OFFICE O/P EST LOW 20 MIN: CPT | Performed by: PODIATRIST

## 2019-05-07 PROCEDURE — 1036F TOBACCO NON-USER: CPT | Performed by: PODIATRIST

## 2019-05-07 RX ORDER — OMEPRAZOLE 20 MG/1
1 CAPSULE, DELAYED RELEASE ORAL
COMMUNITY
Start: 2015-12-16

## 2019-05-07 RX ORDER — SERTRALINE HYDROCHLORIDE 100 MG/1
1 TABLET, FILM COATED ORAL
COMMUNITY

## 2019-05-07 RX ORDER — CLONAZEPAM 0.5 MG/1
1 TABLET ORAL
COMMUNITY

## 2019-05-07 RX ORDER — DOXYCYCLINE HYCLATE 50 MG/1
50 CAPSULE ORAL
COMMUNITY

## 2019-05-07 RX ORDER — METOPROLOL SUCCINATE 25 MG/1
25 TABLET, EXTENDED RELEASE ORAL
COMMUNITY

## 2019-05-07 NOTE — PROGRESS NOTES
[facility] [address]  [address] [place] [address]  [place] [id number]  Dept: [phone]    RETURN PATIENT PROGRESS NOTE  Date of patient's visit: 5/7/2019  Patient's Name:  Sapna Gaspar YOB: 1970            Patient Care Team:  Tasha Oliveros MD as PCP - General (Family Medicine)  Tasha Oliveros MD as PCP - S Attributed Provider  Aat Ann DPM as Physician ([place])  Swathi Rosario MD as Consulting Physician (Infectious Diseases)       Sapna Gaspar 57 y.o. female that presents for follow-up of   Chief Complaint   Patient presents with    Foot Pain    Toe Pain     tips of toes    Numbness     Pt's primary care physician is Tasha Oliveros MD last seen December 18 2018  Symptoms began 10 year(s) ago and are increased . Patient relates pain is Present. Pain is rated 9 out of 10 and is described as excruciating. Treatments prior to today's visit include: seeing neurologist, pain management, rheumatologist.  Currently denies F/C/N/V. Allergies   Allergen Reactions    Azithromycin Hives    Cefuroxime Axetil Hives    Hydrocodone-Acetaminophen Hives    Penicillins Hives    Sulfa Antibiotics Hives     Other reaction(s): Unknown  Other reaction(s): Unknown  Other reaction(s): Unknown  Other reaction(s): Allergy: HIVES;  Other reaction(s): hives  Other reaction(s): Unknown  Other reaction(s): Allergy: HIVES;    Bactrim [Sulfamethoxazole-Trimethoprim]     Dicyclomine     Flomax  [Tamsulosin Hcl] Hives    Lyrica [Pregabalin]     Minocycline     Penicillin G Other (See Comments)    Percocet [Oxycodone-Acetaminophen]      Nausea/vomiting    Savella [Milnacipran Hcl]     Silodosin Other (See Comments)       Past Medical History:   Diagnosis Date    Anxiety     Depression     Fibromyalgia     Hypertension     Migraine        Prior to Admission medications    Medication Sig Start Date End Date Taking?  Authorizing Provider   sertraline (ZOLOFT) 100 MG tablet Take 1 tablet by mouth   Yes Historical Provider, MD   omeprazole (PRILOSEC) 20 MG delayed release capsule Take 1 capsule by mouth 12/16/15  Yes Historical Provider, MD   metoprolol succinate (TOPROL XL) 25 MG extended release tablet Take 25 mg by mouth   Yes Historical Provider, MD   doxycycline (VIBRAMYCIN) 50 MG capsule Take 50 mg by mouth   Yes Historical Provider, MD   clonazePAM (KLONOPIN) 0.5 MG tablet Take 1 tablet by mouth.    Yes Historical Provider, MD   ATRAALANA-TAIN 10 % cream Apply qid to affected 3/4/19  Yes Jeanine Gurrola,    Levonorgestrel-Ethinyl Estrad (LESSINA PO) Take by mouth Take for 21 days   Yes Historical Provider, MD   hydroxychloroquine (PLAQUENIL) 200 MG tablet take 1 tablet by mouth twice a day for 1 month 2/11/19  Yes Historical Provider, MD   metoprolol tartrate (LOPRESSOR) 25 MG tablet take 1 tablet by mouth twice a day 2/11/19  Yes Historical Provider, MD   DULoxetine (CYMBALTA) 60 MG extended release capsule  2/23/19  Yes Historical Provider, MD   glycopyrrolate (ROBINUL) 1 MG tablet take 1 tablet by mouth twice a day MAY INCREASE DOSE TO TWO tablets twice a day AFTER TWO WEEKS 2/14/19  Yes Historical Provider, MD   ranitidine (ZANTAC) 150 MG capsule  2/15/19  Yes Historical Provider, MD   gabapentin (NEURONTIN) 600 MG tablet take 1/2 tablet by mouth three times a day 2/3/19  Yes Historical Provider, MD   rOPINIRole (REQUIP) 1 MG tablet  2/23/19  Yes Historical Provider, MD Yulisa Calzada 180 MCG/ACT AEPB inhaler inhale 2 puffs by mouth once daily 1/28/19  Yes Historical Provider, MD   RA ALLERGY RELIEF 10 MG tablet  1/19/19  Yes Historical Provider, MD   ipratropium (ATROVENT) 0.03 % nasal spray  1/22/19  Yes Historical Provider, MD   montelukast (SINGULAIR) 10 MG tablet  2/14/19  Yes Historical Provider, MD   metroNIDAZOLE (METROGEL) 0.75 % gel apply to affected area twice a day ON CHEEKS 2/7/19  Yes Historical Provider, MD   NONFORMULARY Compounding cream for feet Yes Historical Provider, MD   Biotin 300 MCG TABS Take 1 tablet by mouth daily 10/31/18  Yes Ashley Madrigal DPM   methylphenidate (RITALIN) 5 MG tablet Take 5 mg by mouth 2 times daily. .   Yes Historical Provider, MD   traMADol (ULTRAM) 50 MG tablet Take 50 mg by mouth as needed for Pain. .   Yes Historical Provider, MD   Ketorolac Tromethamine (TORADOL ORAL PO) Take by mouth   Yes Historical Provider, MD   ondansetron (ZOFRAN) 4 MG tablet Take 1 tablet by mouth daily as needed for Nausea or Vomiting 2/26/18  Yes Hussein Rizvi MD       Review of Systems    Review of Systems:  History obtained from chart review and the patient  General ROS: negative for - chills, fatigue, fever, night sweats or weight gain  Constitutional: Negative for chills, diaphoresis, fatigue, fever and unexpected weight change. Musculoskeletal: Positive for arthralgias, gait problem and joint swelling. Neurological ROS: negative for - behavioral changes, confusion, headaches or seizures. Negative for weakness and numbness. Dermatological ROS: negative for - mole changes, rash  Cardiovascular: Negative for leg swelling. Gastrointestinal: Negative for constipation, diarrhea, nausea and vomiting. Lower Extremity Physical Examination:     Vitals: There were no vitals filed for this visit. General: AAO x 3 in NAD. Dermatologic Exam:  Skin lesion/ulceration Absent . Skin No rashes or nodules noted. .       Musculoskeletal:     1st MPJ ROM decreased, Bilateral.  Muscle strength 5/5, Bilateral.  Extrement hypersensitivity and Pain present upon palpation of marion feet. Diffuse pain to digits. Medial longitudinal arch, Bilateral increased.   Ankle ROM guarded,Bilateral.    Dorsally contracted digits absent digits 1-5 Bilateral.     Vascular: DP and PT pulses palpable 2/4, Bilateral.  CFT <3 seconds, Bilateral.  Hair growth present to the level of the digits, Bilateral.  Edema absent, Bilateral.  Varicosities absent, Bilateral. Erythema

## 2019-05-09 ENCOUNTER — OFFICE VISIT (OUTPATIENT)
Dept: FAMILY MEDICINE CLINIC | Age: 49
End: 2019-05-09
Payer: MEDICARE

## 2019-05-09 VITALS
BODY MASS INDEX: 34.37 KG/M2 | DIASTOLIC BLOOD PRESSURE: 98 MMHG | OXYGEN SATURATION: 97 % | HEART RATE: 123 BPM | HEIGHT: 67 IN | WEIGHT: 219 LBS | RESPIRATION RATE: 16 BRPM | SYSTOLIC BLOOD PRESSURE: 158 MMHG

## 2019-05-09 DIAGNOSIS — M79.10 MYALGIA: ICD-10-CM

## 2019-05-09 DIAGNOSIS — M79.7 FIBROMYALGIA: Primary | ICD-10-CM

## 2019-05-09 DIAGNOSIS — F41.9 ANXIETY AND DEPRESSION: ICD-10-CM

## 2019-05-09 DIAGNOSIS — Z13.31 POSITIVE DEPRESSION SCREENING: ICD-10-CM

## 2019-05-09 DIAGNOSIS — R76.8 ANA POSITIVE: ICD-10-CM

## 2019-05-09 DIAGNOSIS — I10 ESSENTIAL HYPERTENSION: ICD-10-CM

## 2019-05-09 DIAGNOSIS — F32.A ANXIETY AND DEPRESSION: ICD-10-CM

## 2019-05-09 PROBLEM — R41.3 MEMORY DEFICIT: Status: RESOLVED | Noted: 2018-01-26 | Resolved: 2019-05-09

## 2019-05-09 PROBLEM — R10.31 RIGHT LOWER QUADRANT ABDOMINAL PAIN: Status: RESOLVED | Noted: 2017-04-03 | Resolved: 2019-05-09

## 2019-05-09 PROCEDURE — G8427 DOCREV CUR MEDS BY ELIG CLIN: HCPCS | Performed by: FAMILY MEDICINE

## 2019-05-09 PROCEDURE — G8417 CALC BMI ABV UP PARAM F/U: HCPCS | Performed by: FAMILY MEDICINE

## 2019-05-09 PROCEDURE — 1036F TOBACCO NON-USER: CPT | Performed by: FAMILY MEDICINE

## 2019-05-09 PROCEDURE — 99214 OFFICE O/P EST MOD 30 MIN: CPT | Performed by: FAMILY MEDICINE

## 2019-05-09 PROCEDURE — G8431 POS CLIN DEPRES SCRN F/U DOC: HCPCS | Performed by: FAMILY MEDICINE

## 2019-05-09 RX ORDER — PROMETHAZINE HYDROCHLORIDE 25 MG/1
25 TABLET ORAL 3 TIMES DAILY PRN
Qty: 30 TABLET | Refills: 3 | Status: SHIPPED | OUTPATIENT
Start: 2019-05-09

## 2019-05-09 RX ORDER — PROMETHAZINE HYDROCHLORIDE 50 MG/ML
50 INJECTION, SOLUTION INTRAMUSCULAR; INTRAVENOUS EVERY 6 HOURS PRN
Status: SHIPPED | OUTPATIENT
Start: 2019-05-09

## 2019-05-09 RX ADMIN — PROMETHAZINE HYDROCHLORIDE 50 MG: 50 INJECTION, SOLUTION INTRAMUSCULAR; INTRAVENOUS at 11:40

## 2019-05-09 ASSESSMENT — PATIENT HEALTH QUESTIONNAIRE - PHQ9
6. FEELING BAD ABOUT YOURSELF - OR THAT YOU ARE A FAILURE OR HAVE LET YOURSELF OR YOUR FAMILY DOWN: 3
SUM OF ALL RESPONSES TO PHQ QUESTIONS 1-9: 25
3. TROUBLE FALLING OR STAYING ASLEEP: 3
2. FEELING DOWN, DEPRESSED OR HOPELESS: 3
7. TROUBLE CONCENTRATING ON THINGS, SUCH AS READING THE NEWSPAPER OR WATCHING TELEVISION: 3
10. IF YOU CHECKED OFF ANY PROBLEMS, HOW DIFFICULT HAVE THESE PROBLEMS MADE IT FOR YOU TO DO YOUR WORK, TAKE CARE OF THINGS AT HOME, OR GET ALONG WITH OTHER PEOPLE: 2
SUM OF ALL RESPONSES TO PHQ QUESTIONS 1-9: 25
SUM OF ALL RESPONSES TO PHQ9 QUESTIONS 1 & 2: 6
1. LITTLE INTEREST OR PLEASURE IN DOING THINGS: 3
4. FEELING TIRED OR HAVING LITTLE ENERGY: 3
9. THOUGHTS THAT YOU WOULD BE BETTER OFF DEAD, OR OF HURTING YOURSELF: 1
8. MOVING OR SPEAKING SO SLOWLY THAT OTHER PEOPLE COULD HAVE NOTICED. OR THE OPPOSITE, BEING SO FIGETY OR RESTLESS THAT YOU HAVE BEEN MOVING AROUND A LOT MORE THAN USUAL: 3
5. POOR APPETITE OR OVEREATING: 3

## 2019-05-09 ASSESSMENT — ENCOUNTER SYMPTOMS
EYE PAIN: 0
BACK PAIN: 1
SHORTNESS OF BREATH: 0
BLOOD IN STOOL: 0

## 2019-05-09 NOTE — PROGRESS NOTES
[facility]  [address] [place] [id number]  Dept: [phone]    Surjit Cristina is a 57 y.o. female who presents today for hermedical conditions/complaints as noted below.   Surjit Cristina is here today c/o Other (lt side numbness )       HPI:     HPI    Seen today with mom  2-3 months hx of flareup of her fibromyalgia, describes pain all over: Arms, legs, head, neck, back, legs, ankles, chest; L side worse than R  Feet sensitive  Saw rheumatologist Dr. Marcos Louise, has had positive GENET, he did not have a diagnosis of that was going on, he initially had her on Plaquenil but she expressed no benefit on this so taken off    Cymbalta, Savella, gabapentin, Lyrica didn't help  EMG  done by the rheumatologist was neg, saw a neurologist who has ordered an MRI of the neck, awaiting insurance approval   She is requesting to see a new rheumatologist Dr. Omer Benjamin through a lot of stressors at her home, history of anxiety and depression, sees psychiatrist will also does counseling for her, no suicidal thoughts, she is being worked up for narcolepsy   Saw cardiologist Dr. Brayan Munoz for her elevated blood pressure, started on metoprolol, BP quite elevated today, she states this happens when she is in a lot of pain and then comes down when the pain gets better, no vision changes    BP Readings from Last 3 Encounters:   05/09/19 (!) 158/98   03/04/19 135/88   02/25/19 (!) 147/85       Patient Active Problem List   Diagnosis    Anxiety and depression    Fibromyalgia    Essential hypertension    Mild intermittent asthma without complication    Gastroesophageal reflux disease without esophagitis    MARIBEL (obstructive sleep apnea)    Irritable bowel syndrome with diarrhea    Chronic left-sided low back pain with left-sided sciatica    Chronic migraine    GENET positive    Atopic rhinitis    Depression    Elevated C-reactive protein (CRP)    Kidney stones    History of ureter stent    LPRD (laryngopharyngeal reflux disease)    Obesity (BMI 30-39. 9)    Singers' nodes    Vitamin D deficiency       Past Medical History:   Diagnosis Date    Anxiety     Depression     Fibromyalgia     Hypertension     Migraine      Past Surgical History:   Procedure Laterality Date     SECTION      CHOLECYSTECTOMY      COCCYX REMOVAL      LITHOTRIPSY      SEPTOPLASTY      TEAR DUCT SURGERY Left     x2    TONSILLECTOMY      TUMOR REMOVAL      left posterior neck- benign    URETER STENT PLACEMENT      WISDOM TOOTH EXTRACTION       Family History   Problem Relation Age of Onset    High Blood Pressure Mother     Diabetes Father     Breast Cancer Maternal Grandmother      Social History     Tobacco Use    Smoking status: Never Smoker    Smokeless tobacco: Never Used   Substance Use Topics    Alcohol use: Yes     Comment: occasional    Drug use: No       Current Outpatient Medications:     promethazine (PHENERGAN) 25 MG tablet, Take 1 tablet by mouth 3 times daily as needed for Nausea, Disp: 30 tablet, Rfl: 3    sertraline (ZOLOFT) 100 MG tablet, Take 1 tablet by mouth, Disp: , Rfl:     omeprazole (PRILOSEC) 20 MG delayed release capsule, Take 1 capsule by mouth, Disp: , Rfl:     metoprolol succinate (TOPROL XL) 25 MG extended release tablet, Take 25 mg by mouth, Disp: , Rfl:     doxycycline (VIBRAMYCIN) 50 MG capsule, Take 50 mg by mouth, Disp: , Rfl:     clonazePAM (KLONOPIN) 0.5 MG tablet, Take 1 tablet by mouth., Disp: , Rfl:     ATRAC-TAIN 10 % cream, Apply qid to affected, Disp: 142 g, Rfl: 11    Levonorgestrel-Ethinyl Estrad (LESSINA PO), Take by mouth Take for 21 days, Disp: , Rfl:     hydroxychloroquine (PLAQUENIL) 200 MG tablet, take 1 tablet by mouth twice a day for 1 month, Disp: , Rfl: 0    metoprolol tartrate (LOPRESSOR) 25 MG tablet, take 1 tablet by mouth twice a day, Disp: , Rfl: 0    DULoxetine (CYMBALTA) 60 MG extended release capsule, , Disp: , Rfl: 0    glycopyrrolate (ROBINUL) 1 MG tablet, take 1 tablet by mouth twice a day MAY INCREASE DOSE TO TWO tablets twice a day AFTER TWO WEEKS, Disp: , Rfl: 0    ranitidine (ZANTAC) 150 MG capsule, , Disp: , Rfl: 1    gabapentin (NEURONTIN) 600 MG tablet, take 1/2 tablet by mouth three times a day, Disp: , Rfl: 0    rOPINIRole (REQUIP) 1 MG tablet, , Disp: , Rfl:     PULMICORT FLEXHALER 180 MCG/ACT AEPB inhaler, inhale 2 puffs by mouth once daily, Disp: , Rfl: 0    RA ALLERGY RELIEF 10 MG tablet, , Disp: , Rfl: 0    ipratropium (ATROVENT) 0.03 % nasal spray, , Disp: , Rfl: 0    montelukast (SINGULAIR) 10 MG tablet, , Disp: , Rfl: 0    metroNIDAZOLE (METROGEL) 0.75 % gel, apply to affected area twice a day ON CHEEKS, Disp: , Rfl: 0    NONFORMULARY, Compounding cream for feet, Disp: , Rfl:     Biotin 300 MCG TABS, Take 1 tablet by mouth daily, Disp: 30 tablet, Rfl: 3    methylphenidate (RITALIN) 5 MG tablet, Take 5 mg by mouth 2 times daily. ., Disp: , Rfl:     traMADol (ULTRAM) 50 MG tablet, Take 50 mg by mouth as needed for Pain. ., Disp: , Rfl:     Ketorolac Tromethamine (TORADOL ORAL PO), Take by mouth, Disp: , Rfl:     ondansetron (ZOFRAN) 4 MG tablet, Take 1 tablet by mouth daily as needed for Nausea or Vomiting, Disp: 30 tablet, Rfl: 0    Subjective:     Review of Systems   Constitutional: Negative for appetite change, diaphoresis, fever and unexpected weight change. HENT: Negative for ear pain. Eyes: Negative for pain. Respiratory: Negative for shortness of breath. Cardiovascular: Negative for palpitations. Gastrointestinal: Negative for blood in stool. Musculoskeletal: Positive for arthralgias, back pain, myalgias and neck pain. Skin: Negative for pallor. Neurological: Negative for seizures. Psychiatric/Behavioral: Positive for agitation, dysphoric mood and sleep disturbance. Negative for suicidal ideas. The patient is nervous/anxious. Objective:     BP (!) 158/98   Pulse 123   Resp 16   Ht 5' 7\" (1.702 m)   Wt 219 lb (99.3 kg)   SpO2 97%   BMI 34.30 kg/m²     Physical Exam   Constitutional: She appears well-developed. HENT:   Head: Normocephalic. Eyes: Conjunctivae and EOM are normal.   Cardiovascular: Normal heart sounds. No murmur heard. Pulmonary/Chest: Effort normal and breath sounds normal. No respiratory distress. She has no wheezes. Musculoskeletal:   Patient has point tenderness no matter where on the skin palpation was done, no joint effusions noted, no rash   Neurological: She is alert. Skin: She is not diaphoretic. Psychiatric: She has a normal mood and affect. Her behavior is normal. Judgment and thought content normal.       Assessment & Plan:      1. Fibromyalgia  2. GENET positive  3. Myalgia  She is requesting second opinion from another rheumatologist, referral made to Dr. Namrata Harvey, etiology of the elevated GENET unclear, I suspect the most recent symptoms are related to the fibromyalgia and poorly controlled anxiety. She has seen a multitude of specialists in the past including infectious disease, neurology, rheumatology, cardiology and no one seems to think that something insidious is going on. Offered referral to a new psychiatrist and therapist, she declined. Offered restarting gabapentin or Lyrica, she declined. She was asking for tramadol, explained this is not indicated for fibromyalgia. - External Referral To Rheumatology    4. Anxiety and depression  5. Positive depression screening  Continue follow-up with psychiatry  - Positive Screen for Clinical Depression with a Documented Follow-up Plan     6. Essential hypertension  BP elevated today, she declines increase the dose of her metoprolol    Call or return to clinic prn if these symptoms worsen or fail to improve as anticipated.   I have reviewed the instructions with the patient, answering all questions to their satisfaction.     Electronically signed by Vidhi Winslow MD on 5/9/2019 at 10:37 AM  On the basis of positive PHQ-9 screening (PHQ-9 Total Score: 25), the following plan was implemented: Continue follow-up with psychiatrist.  Patient will follow-up in 4 week(s) with psychiatrist.

## 2019-07-23 ENCOUNTER — TELEPHONE (OUTPATIENT)
Dept: FAMILY MEDICINE CLINIC | Age: 49
End: 2019-07-23

## 2019-07-23 RX ORDER — ROPINIROLE 2 MG/1
2 TABLET, FILM COATED ORAL NIGHTLY
Qty: 60 TABLET | Refills: 0 | Status: SHIPPED | OUTPATIENT
Start: 2019-07-23 | End: 2019-09-15 | Stop reason: SDUPTHER

## 2019-07-23 NOTE — TELEPHONE ENCOUNTER
Pt states she gets Requip 2mg qhs from Dr. Jamel Ventura a psychiatrist but states she does not see him right now and requests Dr. Kt Quintero to refill requip.

## 2019-09-16 RX ORDER — ROPINIROLE 2 MG/1
2 TABLET, FILM COATED ORAL NIGHTLY
Qty: 90 TABLET | Refills: 1 | Status: SHIPPED | OUTPATIENT
Start: 2019-09-16 | End: 2020-03-05

## 2019-10-07 ENCOUNTER — TELEPHONE (OUTPATIENT)
Dept: FAMILY MEDICINE CLINIC | Age: 49
End: 2019-10-07

## 2019-11-21 ENCOUNTER — HOSPITAL ENCOUNTER (OUTPATIENT)
Dept: CARDIAC CATH/INVASIVE PROCEDURES | Age: 49
Discharge: HOME | End: 2019-11-21
Payer: MEDICARE

## 2019-11-21 PROCEDURE — 93660 TILT TABLE EVALUATION: CPT | Performed by: INTERNAL MEDICINE

## 2020-02-19 ENCOUNTER — HOSPITAL ENCOUNTER (EMERGENCY)
Age: 50
Discharge: HOME | End: 2020-02-19
Attending: EMERGENCY MEDICINE
Payer: MEDICARE

## 2020-02-19 ENCOUNTER — APPOINTMENT (OUTPATIENT)
Dept: CT IMAGING | Age: 50
End: 2020-02-19
Payer: MEDICARE

## 2020-02-19 VITALS
BODY MASS INDEX: 32.58 KG/M2 | WEIGHT: 215 LBS | DIASTOLIC BLOOD PRESSURE: 104 MMHG | RESPIRATION RATE: 18 BRPM | HEART RATE: 101 BPM | HEIGHT: 68 IN | TEMPERATURE: 97.7 F | OXYGEN SATURATION: 97 % | SYSTOLIC BLOOD PRESSURE: 153 MMHG

## 2020-02-19 LAB
-: ABNORMAL
AMORPHOUS: ABNORMAL
ANION GAP SERPL CALCULATED.3IONS-SCNC: 13 MMOL/L (ref 9–17)
BACTERIA: ABNORMAL
BILIRUBIN URINE: NEGATIVE
BUN BLDV-MCNC: 14 MG/DL (ref 6–20)
BUN/CREAT BLD: 16 (ref 9–20)
CALCIUM SERPL-MCNC: 9.1 MG/DL (ref 8.6–10.4)
CASTS UA: ABNORMAL /LPF
CHLORIDE BLD-SCNC: 104 MMOL/L (ref 98–107)
CHP ED QC CHECK: YES
CO2: 23 MMOL/L (ref 20–31)
COLOR: YELLOW
COMMENT UA: ABNORMAL
CREAT SERPL-MCNC: 0.87 MG/DL (ref 0.5–0.9)
CRYSTALS, UA: ABNORMAL /HPF
D-DIMER QUANTITATIVE: 0.54 MG/L FEU
EPITHELIAL CELLS UA: ABNORMAL /HPF (ref 0–5)
GFR AFRICAN AMERICAN: >60 ML/MIN
GFR NON-AFRICAN AMERICAN: >60 ML/MIN
GFR SERPL CREATININE-BSD FRML MDRD: ABNORMAL ML/MIN/{1.73_M2}
GFR SERPL CREATININE-BSD FRML MDRD: ABNORMAL ML/MIN/{1.73_M2}
GLUCOSE BLD-MCNC: 102 MG/DL (ref 70–99)
GLUCOSE URINE: NEGATIVE
KETONES, URINE: NEGATIVE
LEUKOCYTE ESTERASE, URINE: ABNORMAL
MUCUS: ABNORMAL
NITRITE, URINE: NEGATIVE
OTHER OBSERVATIONS UA: ABNORMAL
PH UA: 6 (ref 5–8)
POTASSIUM SERPL-SCNC: 3.5 MMOL/L (ref 3.7–5.3)
PREGNANCY TEST URINE, POC: NORMAL
PROTEIN UA: NEGATIVE
RBC UA: ABNORMAL /HPF (ref 0–2)
RENAL EPITHELIAL, UA: ABNORMAL /HPF
SODIUM BLD-SCNC: 140 MMOL/L (ref 135–144)
SPECIFIC GRAVITY UA: 1.02 (ref 1–1.03)
TRICHOMONAS: ABNORMAL
TURBIDITY: ABNORMAL
URINE HGB: NEGATIVE
UROBILINOGEN, URINE: NORMAL
WBC UA: ABNORMAL /HPF (ref 0–5)
YEAST: ABNORMAL

## 2020-02-19 PROCEDURE — 81001 URINALYSIS AUTO W/SCOPE: CPT

## 2020-02-19 PROCEDURE — 81025 URINE PREGNANCY TEST: CPT

## 2020-02-19 PROCEDURE — 74176 CT ABD & PELVIS W/O CONTRAST: CPT

## 2020-02-19 PROCEDURE — 6360000002 HC RX W HCPCS: Performed by: NURSE PRACTITIONER

## 2020-02-19 PROCEDURE — 80048 BASIC METABOLIC PNL TOTAL CA: CPT

## 2020-02-19 PROCEDURE — 2580000003 HC RX 258: Performed by: NURSE PRACTITIONER

## 2020-02-19 PROCEDURE — 96374 THER/PROPH/DIAG INJ IV PUSH: CPT

## 2020-02-19 PROCEDURE — 85379 FIBRIN DEGRADATION QUANT: CPT

## 2020-02-19 PROCEDURE — 71260 CT THORAX DX C+: CPT

## 2020-02-19 PROCEDURE — 99284 EMERGENCY DEPT VISIT MOD MDM: CPT

## 2020-02-19 PROCEDURE — 6360000004 HC RX CONTRAST MEDICATION: Performed by: NURSE PRACTITIONER

## 2020-02-19 PROCEDURE — 6360000002 HC RX W HCPCS: Performed by: EMERGENCY MEDICINE

## 2020-02-19 RX ORDER — LIDOCAINE 50 MG/G
1 PATCH TOPICAL DAILY
Qty: 30 PATCH | Refills: 0 | Status: SHIPPED | OUTPATIENT
Start: 2020-02-19

## 2020-02-19 RX ORDER — SODIUM CHLORIDE 0.9 % (FLUSH) 0.9 %
10 SYRINGE (ML) INJECTION PRN
Status: DISCONTINUED | OUTPATIENT
Start: 2020-02-19 | End: 2020-02-20 | Stop reason: HOSPADM

## 2020-02-19 RX ORDER — METHOCARBAMOL 500 MG/1
500 TABLET, FILM COATED ORAL 3 TIMES DAILY PRN
Qty: 20 TABLET | Refills: 0 | Status: SHIPPED | OUTPATIENT
Start: 2020-02-19 | End: 2020-02-29

## 2020-02-19 RX ORDER — MORPHINE SULFATE 2 MG/ML
2 INJECTION, SOLUTION INTRAMUSCULAR; INTRAVENOUS ONCE
Status: DISCONTINUED | OUTPATIENT
Start: 2020-02-19 | End: 2020-02-20 | Stop reason: HOSPADM

## 2020-02-19 RX ORDER — MORPHINE SULFATE 2 MG/ML
2 INJECTION, SOLUTION INTRAMUSCULAR; INTRAVENOUS ONCE
Status: COMPLETED | OUTPATIENT
Start: 2020-02-19 | End: 2020-02-19

## 2020-02-19 RX ORDER — 0.9 % SODIUM CHLORIDE 0.9 %
80 INTRAVENOUS SOLUTION INTRAVENOUS ONCE
Status: COMPLETED | OUTPATIENT
Start: 2020-02-19 | End: 2020-02-19

## 2020-02-19 RX ADMIN — Medication 10 ML: at 22:28

## 2020-02-19 RX ADMIN — SODIUM CHLORIDE 80 ML: 9 INJECTION, SOLUTION INTRAVENOUS at 22:28

## 2020-02-19 RX ADMIN — MORPHINE SULFATE 2 MG: 2 INJECTION, SOLUTION INTRAMUSCULAR; INTRAVENOUS at 21:06

## 2020-02-19 RX ADMIN — IOPAMIDOL 75 ML: 755 INJECTION, SOLUTION INTRAVENOUS at 22:28

## 2020-02-19 ASSESSMENT — PAIN SCALES - GENERAL
PAINLEVEL_OUTOF10: 6
PAINLEVEL_OUTOF10: 10

## 2020-02-19 ASSESSMENT — PAIN DESCRIPTION - ORIENTATION: ORIENTATION: LEFT

## 2020-02-19 ASSESSMENT — PAIN DESCRIPTION - LOCATION: LOCATION: BACK;RIB CAGE

## 2020-02-20 ENCOUNTER — CARE COORDINATION (OUTPATIENT)
Dept: CARE COORDINATION | Age: 50
End: 2020-02-20

## 2020-02-20 LAB — HCG, PREGNANCY URINE (POC): NEGATIVE

## 2020-02-20 ASSESSMENT — ENCOUNTER SYMPTOMS
NAUSEA: 0
BACK PAIN: 1
CONSTIPATION: 0
DIARRHEA: 0
COUGH: 0
COLOR CHANGE: 0
SHORTNESS OF BREATH: 0
CHEST TIGHTNESS: 0
VOMITING: 0

## 2020-02-20 NOTE — ED NOTES
Pt presents to ED via private auto accompanied by family with c/o side pain. Pt states that has had left side pain that feels under her left ribs for approximately x1 month, but this morning when she had awakened the pain had increased, and would increase more with movement and palpation to the area. Pt denies SOB at this time. Pt's lung sounds are clear throughout. Pt's mucous membranes are pink and moist, pt's skin is warm and dry. Pt's respirations are even and non-labored, no distress noted at this time, will continue to monitor pt.       Wing Rodriguez RN  02/20/20 1868

## 2020-02-20 NOTE — ED PROVIDER NOTES
TONSILLECTOMY      TUMOR REMOVAL      left posterior neck- benign    URETER STENT PLACEMENT      WISDOM TOOTH EXTRACTION       CURRENT MEDICATIONS       Discharge Medication List as of 2/19/2020 11:25 PM      CONTINUE these medications which have NOT CHANGED    Details   rOPINIRole (REQUIP) 2 MG tablet take 1 tablet by mouth NIGHTLY, Disp-90 tablet, R-1Normal      promethazine (PHENERGAN) 25 MG tablet Take 1 tablet by mouth 3 times daily as needed for Nausea, Disp-30 tablet, R-3Normal      sertraline (ZOLOFT) 100 MG tablet Take 1 tablet by mouthHistorical Med      omeprazole (PRILOSEC) 20 MG delayed release capsule Take 1 capsule by mouthHistorical Med      metoprolol succinate (TOPROL XL) 25 MG extended release tablet Take 25 mg by mouthHistorical Med      doxycycline (VIBRAMYCIN) 50 MG capsule Take 50 mg by mouthHistorical Med      clonazePAM (KLONOPIN) 0.5 MG tablet Take 1 tablet by mouth. Historical Med      ATRAC-TAIN 10 % cream Apply qid to affected, Disp-142 g, R-11, DAWNormal      Levonorgestrel-Ethinyl Estrad (LESSINA PO) Take by mouth Take for 21 daysHistorical Med      hydroxychloroquine (PLAQUENIL) 200 MG tablet take 1 tablet by mouth twice a day for 1 month, R-0Historical Med      metoprolol tartrate (LOPRESSOR) 25 MG tablet take 1 tablet by mouth twice a day, R-0Historical Med      DULoxetine (CYMBALTA) 60 MG extended release capsule R-0Historical Med      glycopyrrolate (ROBINUL) 1 MG tablet take 1 tablet by mouth twice a day MAY INCREASE DOSE TO TWO tablets twice a day AFTER TWO WEEKS, R-0Historical Med      ranitidine (ZANTAC) 150 MG capsule R-1Historical Med      gabapentin (NEURONTIN) 600 MG tablet take 1/2 tablet by mouth three times a day, R-0Historical Med      PULMICORT FLEXHALER 180 MCG/ACT AEPB inhaler inhale 2 puffs by mouth once daily, R-0, DAWHistorical Med      RA ALLERGY RELIEF 10 MG tablet R-0, DAWHistorical Med      ipratropium (ATROVENT) 0.03 % nasal spray R-0Historical Med

## 2020-02-20 NOTE — ED PROVIDER NOTES
EMERGENCY DEPARTMENT ENCOUNTER   ATTENDING ATTESTATION     Pt Name: Ney Barber  MRN: [id number]  [place] 1970  Date of evaluation: 2/19/20   Ney Barber is a 54 y.o. female with CC: Side Pain (stabbing left sided pain behind ribs )    MDM:   This is a 46-year-old female with a history of chronic pain who comes in today with left side pain that is been going on for over a month. She is on OCPs so will obtain a d-dimer if negative most likely costochondritis versus musculoskeletal versus unknown etiology but no life threatening concerned so needs to follow-up as an outpatient. 11:04 PM  CT pulmonary embolism is negative for PE.  CT abdomen reveals nonobstructing renal calculi unknown the etiology for the patient symptoms most likely musculoskeletal patient will be discharged to follow-up as an outpatient. RADIOLOGY:All plain film, CT, MRI, and formal ultrasound images (except ED bedside ultrasound) are read by the radiologist, see reports below, unless otherwise noted in MDM or here. CT ABDOMEN PELVIS WO CONTRAST   Final Result   No acute finding in the abdomen or pelvis. There are multiple bilateral nonobstructing renal calculi. No acute   obstructive uropathy. Mild to moderate gas and stool load in the colon. CT Chest Pulmonary Embolism W Contrast   Final Result   No evidence of pulmonary embolism or acute pulmonary abnormality. LABS: All lab results were reviewed by myself, and all abnormals are listed below.   Labs Reviewed   URINALYSIS - Abnormal; Notable for the following components:       Result Value    Turbidity UA SLIGHTLY CLOUDY (*)     Leukocyte Esterase, Urine SMALL (*)     All other components within normal limits   MICROSCOPIC URINALYSIS - Abnormal; Notable for the following components:    Bacteria, UA FEW (*)     All other components within normal limits   BASIC METABOLIC PANEL - Abnormal; Notable for the following components:    Glucose 102 (*)     Potassium 3.5 (*)     All other components within normal limits   POCT URINE PREGNANCY - Normal   D-DIMER, QUANTITATIVE       FINAL IMPRESSION      1. Rib pain on left side            PASTMEDICAL HISTORY     Past Medical History:   Diagnosis Date    Anxiety     Depression     Fibromyalgia     Hypertension     Migraine      SURGICAL HISTORY       Past Surgical History:   Procedure Laterality Date     SECTION      CHOLECYSTECTOMY      COCCYX REMOVAL      LITHOTRIPSY      SEPTOPLASTY      TEAR DUCT SURGERY Left     x2    TONSILLECTOMY      TUMOR REMOVAL      left posterior neck- benign    URETER STENT PLACEMENT      WISDOM TOOTH EXTRACTION       CURRENT MEDICATIONS       Previous Medications    ATRAC-TAIN 10 % CREAM    Apply qid to affected    BIOTIN 300 MCG TABS    Take 1 tablet by mouth daily    CLONAZEPAM (KLONOPIN) 0.5 MG TABLET    Take 1 tablet by mouth. DOXYCYCLINE (VIBRAMYCIN) 50 MG CAPSULE    Take 50 mg by mouth    DULOXETINE (CYMBALTA) 60 MG EXTENDED RELEASE CAPSULE        GABAPENTIN (NEURONTIN) 600 MG TABLET    take 1/2 tablet by mouth three times a day    GLYCOPYRROLATE (ROBINUL) 1 MG TABLET    take 1 tablet by mouth twice a day MAY INCREASE DOSE TO TWO tablets twice a day AFTER TWO WEEKS    HYDROXYCHLOROQUINE (PLAQUENIL) 200 MG TABLET    take 1 tablet by mouth twice a day for 1 month    IPRATROPIUM (ATROVENT) 0.03 % NASAL SPRAY        KETOROLAC TROMETHAMINE (TORADOL ORAL PO)    Take by mouth    LEVONORGESTREL-ETHINYL ESTRAD (LESSINA PO)    Take by mouth Take for 21 days    METHYLPHENIDATE (RITALIN) 5 MG TABLET    Take 5 mg by mouth 2 times daily. Bibi See     METOPROLOL SUCCINATE (TOPROL XL) 25 MG EXTENDED RELEASE TABLET    Take 25 mg by mouth    METOPROLOL TARTRATE (LOPRESSOR) 25 MG TABLET    take 1 tablet by mouth twice a day    METRONIDAZOLE (METROGEL) 0.75 % GEL    apply to affected area twice a day ON CHEEKS    MONTELUKAST (SINGULAIR) 10 MG TABLET        NONFORMULARY

## 2020-02-20 NOTE — ED NOTES
Pt called out stating that she needs something more for pain, the morphine did not help. Andrew Roque, [facility] notified.       Pauline Germain RN  02/19/20 [id number]

## 2020-02-20 NOTE — CARE COORDINATION
Ambulatory Care Coordination ED Follow up Call       Reason for ED Visit:  Right rib pain   Care Management Risk Score: CMRS 5    Patient was called to follow up with most recent ER visit. There was no answer. unable to leave a message. FU appts/Provider:    No future appointments.     Health Maintenance Due   Topic Date Due    HIV screen  08/31/1987    Cervical cancer screen  08/31/1993    Diabetes screen  08/31/2012    Flu vaccine (1) 09/01/2019

## 2020-03-05 RX ORDER — ROPINIROLE 2 MG/1
2 TABLET, FILM COATED ORAL NIGHTLY
Qty: 90 TABLET | Refills: 1 | Status: SHIPPED | OUTPATIENT
Start: 2020-03-05 | End: 2020-03-27

## 2020-03-25 PROBLEM — K21.9 GASTROESOPHAGEAL REFLUX DISEASE WITHOUT ESOPHAGITIS: Status: RESOLVED | Noted: 2018-01-26 | Resolved: 2020-03-24

## 2020-09-01 ENCOUNTER — HOSPITAL ENCOUNTER (OUTPATIENT)
Dept: CT IMAGING | Age: 50
Discharge: HOME | End: 2020-09-03
Payer: MEDICARE

## 2020-09-01 PROCEDURE — 71260 CT THORAX DX C+: CPT

## 2020-09-01 PROCEDURE — 2580000003 HC RX 258: Performed by: INTERNAL MEDICINE

## 2020-09-01 PROCEDURE — 6360000004 HC RX CONTRAST MEDICATION: Performed by: INTERNAL MEDICINE

## 2020-09-01 RX ORDER — SODIUM CHLORIDE 0.9 % (FLUSH) 0.9 %
10 SYRINGE (ML) INJECTION PRN
Status: DISCONTINUED | OUTPATIENT
Start: 2020-09-01 | End: 2020-09-04 | Stop reason: HOSPADM

## 2020-09-01 RX ORDER — 0.9 % SODIUM CHLORIDE 0.9 %
80 INTRAVENOUS SOLUTION INTRAVENOUS ONCE
Status: COMPLETED | OUTPATIENT
Start: 2020-09-01 | End: 2020-09-01

## 2020-09-01 RX ADMIN — Medication 10 ML: at 17:19

## 2020-09-01 RX ADMIN — IOPAMIDOL 75 ML: 755 INJECTION, SOLUTION INTRAVENOUS at 17:19

## 2020-09-01 RX ADMIN — SODIUM CHLORIDE 80 ML: 9 INJECTION, SOLUTION INTRAVENOUS at 17:19

## 2021-07-22 ENCOUNTER — OFFICE VISIT (OUTPATIENT)
Dept: PODIATRY | Age: 51
End: 2021-07-22
Payer: MEDICARE

## 2021-07-22 VITALS — WEIGHT: 220 LBS | BODY MASS INDEX: 34.53 KG/M2 | HEIGHT: 67 IN

## 2021-07-22 DIAGNOSIS — M72.2 PLANTAR FASCIITIS, LEFT: ICD-10-CM

## 2021-07-22 DIAGNOSIS — M76.62 TENDONITIS, ACHILLES, LEFT: Primary | ICD-10-CM

## 2021-07-22 PROCEDURE — 99213 OFFICE O/P EST LOW 20 MIN: CPT | Performed by: PODIATRIST

## 2021-07-22 PROCEDURE — L4396 STATIC OR DYNAMI AFO PRE CST: HCPCS | Performed by: PODIATRIST

## 2021-07-22 PROCEDURE — 1036F TOBACCO NON-USER: CPT | Performed by: PODIATRIST

## 2021-07-22 PROCEDURE — G8427 DOCREV CUR MEDS BY ELIG CLIN: HCPCS | Performed by: PODIATRIST

## 2021-07-22 PROCEDURE — G8417 CALC BMI ABV UP PARAM F/U: HCPCS | Performed by: PODIATRIST

## 2021-07-22 NOTE — PROGRESS NOTES
[address] [facility] [id number] [facility]  Dept: [phone]    RETURN PATIENT PROGRESS NOTE  Date of patient's visit: 7/22/2021  Patient's Name:  Eloy Buchanan YOB: 1970            Patient Care Team:  Zeyad Mcdaniel MD as PCP - General (Internal Medicine)  Corin Malik DPM as Physician ([address] [place] [address])  Jennifer Lee MD as Consulting Physician (Infectious Diseases)       Eloy Buchanan 52 y.o. female that presents for follow-up of   Chief Complaint   Patient presents with    Foot Pain     left foot x 2 months     Pt's primary care physician is Zeyad Mcdaniel MD last seen July 23 2020  Symptoms began 2 month(s) ago and are increased to left heel. Patient relates pain is Present. Pain is rated 8 out of 10 and is described as constant. Treatments prior to today's visit include: none. Currently denies F/C/N/V. Allergies   Allergen Reactions    Azithromycin Hives    Cefuroxime Axetil Hives    Hydrocodone-Acetaminophen Hives    Penicillins Hives    Sulfa Antibiotics Hives     Other reaction(s): Unknown  Other reaction(s): Unknown  Other reaction(s): Unknown  Other reaction(s): Allergy: HIVES;  Other reaction(s): hives  Other reaction(s): Unknown  Other reaction(s): Allergy: HIVES;    Bactrim [Sulfamethoxazole-Trimethoprim]     Dicyclomine     Flomax  [Tamsulosin Hcl] Hives    Lyrica [Pregabalin]     Minocycline     Other      Other reaction(s): hives  Other reaction(s): hives  Other reaction(s): hives  Other reaction(s): hives    Penicillin G Other (See Comments)    Percocet [Oxycodone-Acetaminophen]      Nausea/vomiting    Savella [Milnacipran Hcl]     Silodosin Other (See Comments)       Past Medical History:   Diagnosis Date    Anxiety     Depression     Fibromyalgia     Hypertension     Migraine        Prior to Admission medications    Medication Sig Start Date End Date Taking?  Authorizing Provider   rOPINIRole (REQUIP) 2 MG tablet take 1 tablet by mouth nightly 3/27/20  Yes Garfield Rome MD   lidocaine (LIDODERM) 5 % Place 1 patch onto the skin daily 12 hours on, 12 hours off. 2/19/20  Yes Anat Edwards, KEVIN - CNP   promethazine (PHENERGAN) 25 MG tablet Take 1 tablet by mouth 3 times daily as needed for Nausea 5/9/19  Yes Garfield Rome MD   sertraline (ZOLOFT) 100 MG tablet Take 1 tablet by mouth   Yes Historical Provider, MD   omeprazole (PRILOSEC) 20 MG delayed release capsule Take 1 capsule by mouth 12/16/15  Yes Historical Provider, MD   metoprolol succinate (TOPROL XL) 25 MG extended release tablet Take 25 mg by mouth   Yes Historical Provider, MD   doxycycline (VIBRAMYCIN) 50 MG capsule Take 50 mg by mouth   Yes Historical Provider, MD   clonazePAM (KLONOPIN) 0.5 MG tablet Take 1 tablet by mouth.    Yes Historical Provider, MD   ATRAC-TAIN 10 % cream Apply qid to affected 3/4/19  Yes Jeanine Gurrola,    Levonorgestrel-Ethinyl Estrad (LESSINA PO) Take by mouth Take for 21 days   Yes Historical Provider, MD   hydroxychloroquine (PLAQUENIL) 200 MG tablet take 1 tablet by mouth twice a day for 1 month 2/11/19  Yes Historical Provider, MD   metoprolol tartrate (LOPRESSOR) 25 MG tablet take 1 tablet by mouth twice a day 2/11/19  Yes Historical Provider, MD   DULoxetine (CYMBALTA) 60 MG extended release capsule  2/23/19  Yes Historical Provider, MD   glycopyrrolate (ROBINUL) 1 MG tablet take 1 tablet by mouth twice a day MAY INCREASE DOSE TO TWO tablets twice a day AFTER TWO WEEKS 2/14/19  Yes Historical Provider, MD   ranitidine (ZANTAC) 150 MG capsule  2/15/19  Yes Historical Provider, MD   gabapentin (NEURONTIN) 600 MG tablet take 1/2 tablet by mouth three times a day 2/3/19  Yes Historical Provider, MD Sandro Avila 180 MCG/ACT AEPB inhaler inhale 2 puffs by mouth once daily 1/28/19  Yes Historical Provider, MD   RA ALLERGY RELIEF 10 MG tablet  1/19/19  Yes Historical Provider, MD   ipratropium (ATROVENT) 0.03 % nasal spray  1/22/19  Yes Historical Provider, MD   montelukast (SINGULAIR) 10 MG tablet  2/14/19  Yes Historical Provider, MD   metroNIDAZOLE (METROGEL) 0.75 % gel apply to affected area twice a day ON CHEEKS 2/7/19  Yes Historical Provider, MD   NONFORMULARY Compounding cream for feet   Yes Historical Provider, MD   Biotin 300 MCG TABS Take 1 tablet by mouth daily 10/31/18  Yes Mariah Sandoval DPM   methylphenidate (RITALIN) 5 MG tablet Take 5 mg by mouth 2 times daily. .   Yes Historical Provider, MD   traMADol (ULTRAM) 50 MG tablet Take 50 mg by mouth as needed for Pain. .   Yes Historical Provider, MD   Ketorolac Tromethamine (TORADOL ORAL PO) Take by mouth   Yes Historical Provider, MD   ondansetron (ZOFRAN) 4 MG tablet Take 1 tablet by mouth daily as needed for Nausea or Vomiting 2/26/18  Yes Messi Garner MD       Review of Systems    Review of Systems:  History obtained from chart review and the patient  General ROS: negative for - chills, fatigue, fever, night sweats or weight gain  Constitutional: Negative for chills, diaphoresis, fatigue, fever and unexpected weight change. Musculoskeletal: Positive for arthralgias, gait problem and joint swelling. Neurological ROS: negative for - behavioral changes, confusion, headaches or seizures. Negative for weakness and numbness. Dermatological ROS: negative for - mole changes, rash  Cardiovascular: Negative for leg swelling. Gastrointestinal: Negative for constipation, diarrhea, nausea and vomiting. Lower Extremity Physical Examination:     Vitals: There were no vitals filed for this visit. General: AAO x 3 in NAD. Dermatologic Exam:  Skin lesion/ulceration Absent . Skin No rashes or nodules noted. .       Musculoskeletal:     1st MPJ ROM decreased, Bilateral.  Muscle strength 5/5, Bilateral.  Pain present upon palpation of left posterior heel along achilles tendon insertion and left plantar fascia. Medial longitudinal arch, Bilateral WNL.   Ankle ROM limited dorsiflexion, left. Dorsally contracted digits absent digits 1-5 Bilateral.     Vascular: DP and PT pulses palpable 2/4, Bilateral.  CFT <3 seconds, Bilateral.  Hair growth present to the level of the digits, Bilateral.  Edema absent, Bilateral.  Varicosities absent, Bilateral. Erythema absent, Bilateral    Neurological: Sensation intact to light touch to level of digits, Bilateral.  Protective sensation intact 10/10 sites via 5.07/10g [place]-Be Monofilament, Bilateral.  negative Tinel's, Bilateral.  negative Valleix sign, Bilateral.      Integument: Warm, dry, supple, Bilateral.  Open lesion absent, Bilateral.  Interdigital maceration absent to web spaces 1-4, Bilateral.  Nails are normal in length, thickness and color 1-5 bilateral.  Fissures absent, Bilateral.       Asessment: Patient is a 52 y.o. female with:    Diagnosis Orders   1. Tendonitis, Achilles, left  KY STATIC OR DYNAMI AFO PRE OTS   2. Plantar fasciitis, left  [place] STATIC OR DYNAMI AFO PRE OTS         Plan: Patient examined and evaluated. Current condition and treatment options discussed in detail. I have dispensed a posterior night splint. Due to the patient's diagnosis and related symptoms this is medically necessary for the treatment. The function of this device is to restrict and limit motion and provide stabilization and compression to the affected area. This device will allow the patient to slowly increase strength and ROM of the extremity. Specific instructions on weight bearing and ROM exercises were discussed and given to the patient. The goals and function of this device was explained in detail to the patient. Upon gait analysis, the device appeared to be fitting well and the patient states that the device is comfortable at this time. The patient was shown how to properly apply, wear, and care for the device. The patient was able to apply properly and ambulate without distress.  At that time, the device was  dispensed, it

## 2022-04-02 ENCOUNTER — HOSPITAL ENCOUNTER (EMERGENCY)
Age: 52
Discharge: HOME | End: 2022-04-02
Attending: STUDENT IN AN ORGANIZED HEALTH CARE EDUCATION/TRAINING PROGRAM
Payer: MEDICARE

## 2022-04-02 ENCOUNTER — APPOINTMENT (OUTPATIENT)
Dept: CT IMAGING | Age: 52
End: 2022-04-02
Payer: MEDICARE

## 2022-04-02 VITALS
TEMPERATURE: 97.4 F | BODY MASS INDEX: 32.96 KG/M2 | RESPIRATION RATE: 16 BRPM | SYSTOLIC BLOOD PRESSURE: 160 MMHG | OXYGEN SATURATION: 98 % | HEIGHT: 67 IN | WEIGHT: 210 LBS | HEART RATE: 99 BPM | DIASTOLIC BLOOD PRESSURE: 98 MMHG

## 2022-04-02 DIAGNOSIS — S01.01XA LACERATION OF SCALP, INITIAL ENCOUNTER: ICD-10-CM

## 2022-04-02 DIAGNOSIS — S09.90XA INJURY OF HEAD, INITIAL ENCOUNTER: Primary | ICD-10-CM

## 2022-04-02 PROCEDURE — 70450 CT HEAD/BRAIN W/O DYE: CPT

## 2022-04-02 PROCEDURE — 99282 EMERGENCY DEPT VISIT SF MDM: CPT

## 2022-04-02 PROCEDURE — 6370000000 HC RX 637 (ALT 250 FOR IP): Performed by: NURSE PRACTITIONER

## 2022-04-02 PROCEDURE — 12001 RPR S/N/AX/GEN/TRNK 2.5CM/<: CPT

## 2022-04-02 RX ADMIN — Medication 3 ML: at 21:59

## 2022-04-02 ASSESSMENT — ENCOUNTER SYMPTOMS
COLOR CHANGE: 0
FACIAL SWELLING: 0
SHORTNESS OF BREATH: 0
ABDOMINAL PAIN: 0
EYE PAIN: 0
NAUSEA: 0
TROUBLE SWALLOWING: 0
PHOTOPHOBIA: 0
VOMITING: 0
BACK PAIN: 0
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
## 2022-04-10 ENCOUNTER — HOSPITAL ENCOUNTER (EMERGENCY)
Age: 52
Discharge: HOME | End: 2022-04-10
Attending: EMERGENCY MEDICINE
Payer: MEDICARE

## 2022-04-10 VITALS
HEIGHT: 68 IN | BODY MASS INDEX: 31.83 KG/M2 | RESPIRATION RATE: 75 BRPM | TEMPERATURE: 97.7 F | HEART RATE: 77 BPM | WEIGHT: 210 LBS | OXYGEN SATURATION: 99 %

## 2022-04-10 DIAGNOSIS — Z48.02 ENCOUNTER FOR STAPLE REMOVAL: Primary | ICD-10-CM

## 2022-04-10 PROCEDURE — 99283 EMERGENCY DEPT VISIT LOW MDM: CPT

## 2022-04-10 NOTE — ED PROVIDER NOTES
[address] [place] [address] ED  eMERGENCY dEPARTMENT eNCOUnter      Pt Name: Sandrita Adorno  MRN: [id number]  [place] 1970  Date of evaluation: 4/10/22      CHIEF COMPLAINT       Chief Complaint   Patient presents with    Other     staple removel from lac on head         HISTORY OF PRESENT ILLNESS    Sandrita Adorno is a 54 y.o. female who presents complaining of needs staples removed  The history is provided by the patient. Suture / Staple Removal   The sutures were placed 7 to 10 days ago. There has been no treatment since the wound repair. There has been no drainage from the wound. The redness has improved. The swelling has improved. The pain has improved. REVIEW OF SYSTEMS       Review of Systems   Skin: Positive for wound. All other systems reviewed and are negative. PAST MEDICAL HISTORY     Past Medical History:   Diagnosis Date    Anxiety     Depression     Fibromyalgia     Hypertension     Migraine        SURGICAL HISTORY       Past Surgical History:   Procedure Laterality Date     SECTION      CHOLECYSTECTOMY      COCCYX REMOVAL      LITHOTRIPSY      SEPTOPLASTY      TEAR DUCT SURGERY Left     x2    TONSILLECTOMY      TUMOR REMOVAL      left posterior neck- benign    URETER STENT PLACEMENT      WISDOM TOOTH EXTRACTION         CURRENT MEDICATIONS       Previous Medications    ATRAC-TAIN 10 % CREAM    Apply qid to affected    BIOTIN 300 MCG TABS    Take 1 tablet by mouth daily    CLONAZEPAM (KLONOPIN) 0.5 MG TABLET    Take 1 tablet by mouth.     DOXYCYCLINE (VIBRAMYCIN) 50 MG CAPSULE    Take 50 mg by mouth    DULOXETINE (CYMBALTA) 60 MG EXTENDED RELEASE CAPSULE        GABAPENTIN (NEURONTIN) 600 MG TABLET    take 1/2 tablet by mouth three times a day    GLYCOPYRROLATE (ROBINUL) 1 MG TABLET    take 1 tablet by mouth twice a day MAY INCREASE DOSE TO TWO tablets twice a day AFTER TWO WEEKS    HYDROXYCHLOROQUINE (PLAQUENIL) 200 MG TABLET    take 1 tablet by mouth twice a day for 1 month    IPRATROPIUM (ATROVENT) 0.03 % NASAL SPRAY        KETOROLAC TROMETHAMINE (TORADOL ORAL PO)    Take by mouth    LEVONORGESTREL-ETHINYL ESTRAD (LESSINA PO)    Take by mouth Take for 21 days    LIDOCAINE (LIDODERM) 5 %    Place 1 patch onto the skin daily 12 hours on, 12 hours off. METHYLPHENIDATE (RITALIN) 5 MG TABLET    Take 5 mg by mouth 2 times daily. Juan Pablo  METOPROLOL SUCCINATE (TOPROL XL) 25 MG EXTENDED RELEASE TABLET    Take 25 mg by mouth    METOPROLOL TARTRATE (LOPRESSOR) 25 MG TABLET    take 1 tablet by mouth twice a day    METRONIDAZOLE (METROGEL) 0.75 % GEL    apply to affected area twice a day ON CHEEKS    MONTELUKAST (SINGULAIR) 10 MG TABLET        NONFORMULARY    Compounding cream for feet    OMEPRAZOLE (PRILOSEC) 20 MG DELAYED RELEASE CAPSULE    Take 1 capsule by mouth    ONDANSETRON (ZOFRAN) 4 MG TABLET    Take 1 tablet by mouth daily as needed for Nausea or Vomiting    PROMETHAZINE (PHENERGAN) 25 MG TABLET    Take 1 tablet by mouth 3 times daily as needed for Nausea    PULMICORT FLEXHALER 180 MCG/ACT AEPB INHALER    inhale 2 puffs by mouth once daily    RA ALLERGY RELIEF 10 MG TABLET        RANITIDINE (ZANTAC) 150 MG CAPSULE        ROPINIROLE (REQUIP) 2 MG TABLET    take 1 tablet by mouth nightly    SERTRALINE (ZOLOFT) 100 MG TABLET    Take 1 tablet by mouth    TRAMADOL (ULTRAM) 50 MG TABLET    Take 50 mg by mouth as needed for Pain. Juan Pablo Cotton ALLERGIES     is allergic to azithromycin, cefuroxime axetil, hydrocodone-acetaminophen, penicillins, sulfa antibiotics, bactrim [sulfamethoxazole-trimethoprim], dicyclomine, flomax  [tamsulosin hcl], lyrica [pregabalin], minocycline, other, penicillin g, percocet [oxycodone-acetaminophen], savella [milnacipran hcl], and silodosin. FAMILY HISTORY     She indicated that her mother is alive. She indicated that her father is alive. She indicated that her maternal grandmother is . SOCIAL HISTORY      reports that she has never smoked.  She has never used smokeless tobacco. She reports current alcohol use. She reports that she does not use drugs. PHYSICAL EXAM     INITIAL VITALS: Pulse 77   Temp 97.7 °F (36.5 °C) (Oral)   Resp (!) 75   Ht 5' 7.5\" (1.715 m)   Wt 210 lb (95.3 kg)   SpO2 99%   BMI 32.41 kg/m²      Physical Exam  Constitutional:       Appearance: Normal appearance. HENT:      Head:        Comments: Well-healing laceration to the left parietal area 3 staples are in place no signs of infection no swelling no redness  Eyes:      Extraocular Movements: Extraocular movements intact. Neurological:      Mental Status: She is alert. MEDICAL DECISION MAKING:     Removed 3 staples from the left parietal area without difficulty patient tolerated well. Tylenol Motrin if needed for pain follow-up with your primary care provider if needed return if worse or other concerns. DIAGNOSTIC RESULTS     EKG: All EKG's are interpreted by the Emergency Department Physician who either signs or Co-signs this chart in the absence of acardiologist.    RADIOLOGY:Allplain film, CT, MRI, and formal ultrasound images (except ED bedside ultrasound) are read by the radiologist and the images and interpretations are directly viewed by the emergency physician. LABS:All lab results were reviewed by myself, and all abnormals are listed below. Labs Reviewed - No data to display      EMERGENCY DEPARTMENT COURSE:   Vitals:    Vitals:    04/10/22 1312   Pulse: 77   Resp: (!) 75   Temp: 97.7 °F (36.5 °C)   TempSrc: Oral   SpO2: 99%   Weight: 210 lb (95.3 kg)   Height: 5' 7.5\" (1.715 m)       The patient was given the following medications while in the emergency department:  No orders of the defined types were placed in this encounter. -------------------------      CRITICAL CARE:       CONSULTS:  None    PROCEDURES:  Procedures    FINAL IMPRESSION      1.  Encounter for staple removal          DISPOSITION/PLAN   DISPOSITION        PATIENT REFERREDTO:  Marit Lennox, MD  [address] [address]  [phone]      As needed    [facility] ED  [address] [facility]  [phone]    If symptoms worsen      DISCHARGEMEDICATIONS:  New Prescriptions    No medications on file       (Please note that portions of this note were completed with a voice recognition program.  Efforts were made to edit thedictations but occasionally words are mis-transcribed.)    MARTHA De Jesus PA-C  04/10/22 5284

## 2022-04-10 NOTE — ED PROVIDER NOTES
eMERGENCY dEPARTMENT eNCOUnter   Independent Attestation     Pt Name: Yuli Luis  MRN: [id number]  [place] 1970  Date of evaluation: 4/10/22     Yuli Luis is a 54 y.o. female with CC: Other (staple removel from lac on head)      This visit was performed by both a physician and an APC. I performed all aspects of the MDM as documented. Based on the medical record the care appears appropriate. I was personally available for consultation in the Emergency Department.     The care is provided during an unprecedented national emergency due to the novel coronavirus, Ewa Arriola MD  Attending Emergency Physician                    Nathaniel Ribeiro MD  04/10/22 2693

## 2022-05-05 ENCOUNTER — OFFICE VISIT (OUTPATIENT)
Dept: PODIATRY | Age: 52
End: 2022-05-05
Payer: MEDICARE

## 2022-05-05 VITALS — WEIGHT: 210 LBS | HEIGHT: 68 IN | BODY MASS INDEX: 31.83 KG/M2

## 2022-05-05 DIAGNOSIS — M79.2 NEURITIS: ICD-10-CM

## 2022-05-05 DIAGNOSIS — M76.62 ACHILLES TENDINITIS OF LEFT LOWER EXTREMITY: Primary | ICD-10-CM

## 2022-05-05 DIAGNOSIS — M72.2 PLANTAR FASCIITIS, LEFT: ICD-10-CM

## 2022-05-05 PROCEDURE — G8427 DOCREV CUR MEDS BY ELIG CLIN: HCPCS | Performed by: PODIATRIST

## 2022-05-05 PROCEDURE — G8417 CALC BMI ABV UP PARAM F/U: HCPCS | Performed by: PODIATRIST

## 2022-05-05 PROCEDURE — 1036F TOBACCO NON-USER: CPT | Performed by: PODIATRIST

## 2022-05-05 PROCEDURE — 99214 OFFICE O/P EST MOD 30 MIN: CPT | Performed by: PODIATRIST

## 2022-05-05 RX ORDER — GABAPENTIN 100 MG/1
100 CAPSULE ORAL 3 TIMES DAILY
Qty: 90 CAPSULE | Refills: 0 | Status: SHIPPED | OUTPATIENT
Start: 2022-05-05 | End: 2022-06-20

## 2022-05-05 NOTE — PROGRESS NOTES
[facility] PODIATRY  [address] [facility] [address]  Dept: [phone]    RETURN PATIENT PROGRESS NOTE  Date of patient's visit: 5/5/2022  Patient's Name:  Bradley Ceron YOB: 1970            Patient Care Team:  Yaneth Hurd MD as PCP - General (Internal Medicine)  Davon Talley DPM as Physician (Demario Mckeon)  Jose Rose MD as Consulting Physician (Infectious Diseases)       Bradley Ceron 54 y.o. female that presents for follow-up of   Chief Complaint   Patient presents with    Foot Pain     bl heel pain left more severe x 3 weeks     Pt's primary care physician is Yaneth Hurd MD last seen July 23 2020  Symptoms began 3 week(s) ago and are increased . Patient relates pain is Present. Pain is rated 10 out of 10 and is described as constant. She complains of diffuse pain all over feet and ankle and cannot tolerate close toed shoes. Treatments prior to today's visit include: previous podiatry treatment, Voltaren Gel, Biofreeze, Bengay, aleve. Currently denies F/C/N/V. Allergies   Allergen Reactions    Azithromycin Hives    Cefuroxime Axetil Hives    Hydrocodone-Acetaminophen Hives    Penicillins Hives    Sulfa Antibiotics Hives     Other reaction(s): Unknown  Other reaction(s): Unknown  Other reaction(s): Unknown  Other reaction(s): Allergy: HIVES;  Other reaction(s): hives  Other reaction(s): Unknown  Other reaction(s): Allergy: HIVES;    Bactrim [Sulfamethoxazole-Trimethoprim]     Dicyclomine     Diphenhydramine      Other reaction(s):  Other: See Comments    Flomax  [Tamsulosin Hcl] Hives    Lyrica [Pregabalin]     Minocycline     Other      Other reaction(s): hives  Other reaction(s): hives  Other reaction(s): hives  Other reaction(s): hives    Penicillin G Other (See Comments)    Percocet [Oxycodone-Acetaminophen]      Nausea/vomiting    Savella [Milnacipran Hcl]     Silodosin Other (See Comments)       Past Medical History:   Diagnosis Date    Anxiety     Depression     Fibromyalgia     Hypertension     Migraine        Prior to Admission medications    Medication Sig Start Date End Date Taking? Authorizing Provider   rOPINIRole (REQUIP) 2 MG tablet take 1 tablet by mouth nightly 3/27/20  Yes Cem Longoria MD   lidocaine (LIDODERM) 5 % Place 1 patch onto the skin daily 12 hours on, 12 hours off. 2/19/20  Yes Anat A Lump, APRN - CNP   promethazine (PHENERGAN) 25 MG tablet Take 1 tablet by mouth 3 times daily as needed for Nausea 5/9/19  Yes Cem Longoria MD   sertraline (ZOLOFT) 100 MG tablet Take 1 tablet by mouth   Yes Historical Provider, MD   omeprazole (PRILOSEC) 20 MG delayed release capsule Take 1 capsule by mouth 12/16/15  Yes Historical Provider, MD   metoprolol succinate (TOPROL XL) 25 MG extended release tablet Take 25 mg by mouth   Yes Historical Provider, MD   doxycycline (VIBRAMYCIN) 50 MG capsule Take 50 mg by mouth   Yes Historical Provider, MD   clonazePAM (KLONOPIN) 0.5 MG tablet Take 1 tablet by mouth.    Yes Historical Provider, MD RAINES 10 % cream Apply qid to affected 3/4/19  Yes Jeanine Gurrola,    Levonorgestrel-Ethinyl Estrad (LESSINA PO) Take by mouth Take for 21 days   Yes Historical Provider, MD   hydroxychloroquine (PLAQUENIL) 200 MG tablet take 1 tablet by mouth twice a day for 1 month 2/11/19  Yes Historical Provider, MD   metoprolol tartrate (LOPRESSOR) 25 MG tablet take 1 tablet by mouth twice a day 2/11/19  Yes Historical Provider, MD   DULoxetine (CYMBALTA) 60 MG extended release capsule  2/23/19  Yes Historical Provider, MD   glycopyrrolate (ROBINUL) 1 MG tablet take 1 tablet by mouth twice a day MAY INCREASE DOSE TO TWO tablets twice a day AFTER TWO WEEKS 2/14/19  Yes Historical Provider, MD   ranitidine (ZANTAC) 150 MG capsule  2/15/19  Yes Historical Provider, MD   gabapentin (NEURONTIN) 600 MG tablet take 1/2 tablet by mouth three times a day 2/3/19  Yes Historical Provider, MD Salma Marion 180 MCG/ACT AEPB inhaler inhale 2 puffs by mouth once daily 1/28/19  Yes Historical Provider, MD   RA ALLERGY RELIEF 10 MG tablet  1/19/19  Yes Historical Provider, MD   ipratropium (ATROVENT) 0.03 % nasal spray  1/22/19  Yes Historical Provider, MD   montelukast (SINGULAIR) 10 MG tablet  2/14/19  Yes Historical Provider, MD   metroNIDAZOLE (METROGEL) 0.75 % gel apply to affected area twice a day ON CHEEKS 2/7/19  Yes Historical Provider, MD   NONFORMULARY Compounding cream for feet   Yes Historical Provider, MD   Biotin 300 MCG TABS Take 1 tablet by mouth daily 10/31/18  Yes Isai Paredes DPM   methylphenidate (RITALIN) 5 MG tablet Take 5 mg by mouth 2 times daily. .   Yes Historical Provider, MD   traMADol (ULTRAM) 50 MG tablet Take 50 mg by mouth as needed for Pain. .   Yes Historical Provider, MD   Ketorolac Tromethamine (TORADOL ORAL PO) Take by mouth   Yes Historical Provider, MD   ondansetron (ZOFRAN) 4 MG tablet Take 1 tablet by mouth daily as needed for Nausea or Vomiting 2/26/18  Yes Patrice Singh MD       Review of Systems    Review of Systems:  History obtained from chart review and the patient  General ROS: negative for - chills, fatigue, fever, night sweats or weight gain  Constitutional: Negative for chills, diaphoresis, fatigue, fever and unexpected weight change. Musculoskeletal: Positive for arthralgias, gait problem and joint swelling. Neurological ROS: negative for - behavioral changes, confusion, headaches or seizures. Negative for weakness and numbness. Dermatological ROS: negative for - mole changes, rash  Cardiovascular: Negative for leg swelling. Gastrointestinal: Negative for constipation, diarrhea, nausea and vomiting. Lower Extremity Physical Examination:     Vitals: There were no vitals filed for this visit. General: AAO x 3 in NAD. Dermatologic Exam:  Skin lesion/ulceration Absent .    Skin No rashes or nodules noted..       Musculoskeletal:     1st MPJ ROM decreased, Bilateral. +POP left achilles tendon. Muscle strength 5/5, Bilateral. POP of the right and left plantar medial calcaneal tuberosity. Pain increased with Dorsiflexion of the right and left lesser toes. Negative pain on compression of the calcaneus bilaterally Medial longitudinal arch, Bilateral WNL. Ankle ROM WNL,Bilateral.    Dorsally contracted digits absent digits 1-5 Bilateral.     Vascular: DP and PT pulses palpable 2/4, Bilateral.  CFT <3 seconds, Bilateral.  Hair growth present to the level of the digits, Bilateral.  Edema absent, Bilateral.  Varicosities absent, Bilateral. Erythema absent, Bilateral    Neurological: hypersensative Sensation intact to light touch to level of digits, Bilateral.  Protective sensation intact 10/10 sites via 5.07/10g [place]-Be Monofilament, Bilateral.  negative Tinel's, Bilateral.  negative Valleix sign, Bilateral.      Integument: Warm, dry, supple, Bilateral.  Open lesion absent, Bilateral.  Interdigital maceration absent to web spaces 1-4, Bilateral.  Nails are normal in length, thickness and color 1-5 bilateral.  Fissures absent, Bilateral.             Asessment: Patient is a 54 y.o. female with:    Diagnosis Orders   1. Achilles tendinitis of left lower extremity  Ambulatory referral to Physical Therapy   2. Plantar fasciitis, left     3. Neuritis           Plan: Patient examined and evaluated. Current condition and treatment options discussed in detail. Advised pt to consider dry needling for achilles tendonitis and plantar fasciitis. Recommend referral for rheumatology consult for further treatment of fibromyalgia. Discussed in length that mental stress can affect the whole body. Pt currently talking with therapist.  All labs were reviewed and all imagining including the above findings were reviewed PRIOR to the patients arrival and with the patient today. Previous patient encounter was reviewed. Encounters from the patients other medical providers were reviewed and noted. Time was spent educating the patient and their families/caregivers on proper care of the feet and ankles. All the above diagnosis were addressed at todays visit and all questions were answered. A total of 45 minutes was spent with this patients encounter which included charting after the patients visit    . Verbal and written instructions given to patient. Contact office with any questions/problems/concerns. No orders of the defined types were placed in this encounter. No orders of the defined types were placed in this encounter. RTC in 2month(s).     5/5/2022      Electronically signed by Lio Jarrell DPM on 5/5/2022 at 2:31 PM  5/5/2022

## 2022-06-20 RX ORDER — GABAPENTIN 100 MG/1
CAPSULE ORAL
Qty: 90 CAPSULE | Refills: 1 | Status: SHIPPED | OUTPATIENT
Start: 2022-06-20 | End: 2022-08-16

## 2022-08-16 RX ORDER — GABAPENTIN 100 MG/1
CAPSULE ORAL
Qty: 90 CAPSULE | Refills: 1 | Status: SHIPPED | OUTPATIENT
Start: 2022-08-16 | End: 2022-10-25

## 2022-09-01 ENCOUNTER — HOSPITAL ENCOUNTER (EMERGENCY)
Age: 52
Discharge: HOME | End: 2022-09-01
Attending: EMERGENCY MEDICINE
Payer: MEDICARE

## 2022-09-01 ENCOUNTER — APPOINTMENT (OUTPATIENT)
Dept: GENERAL RADIOLOGY | Age: 52
End: 2022-09-01
Payer: MEDICARE

## 2022-09-01 VITALS
RESPIRATION RATE: 15 BRPM | DIASTOLIC BLOOD PRESSURE: 93 MMHG | SYSTOLIC BLOOD PRESSURE: 154 MMHG | WEIGHT: 210 LBS | BODY MASS INDEX: 32.41 KG/M2 | OXYGEN SATURATION: 95 % | HEART RATE: 83 BPM | TEMPERATURE: 98.2 F

## 2022-09-01 DIAGNOSIS — R10.9 LEFT FLANK PAIN: ICD-10-CM

## 2022-09-01 DIAGNOSIS — E86.0 DEHYDRATION: ICD-10-CM

## 2022-09-01 DIAGNOSIS — R55 NEAR SYNCOPE: Primary | ICD-10-CM

## 2022-09-01 LAB
ABSOLUTE EOS #: 0.18 K/UL (ref 0–0.44)
ABSOLUTE IMMATURE GRANULOCYTE: 0.05 K/UL (ref 0–0.3)
ABSOLUTE LYMPH #: 2.15 K/UL (ref 1.1–3.7)
ABSOLUTE MONO #: 0.55 K/UL (ref 0.1–1.2)
ALBUMIN SERPL-MCNC: 4.7 G/DL (ref 3.5–5.2)
ALP BLD-CCNC: 77 U/L (ref 35–104)
ALT SERPL-CCNC: 34 U/L (ref 5–33)
ANION GAP SERPL CALCULATED.3IONS-SCNC: 15 MMOL/L (ref 9–17)
AST SERPL-CCNC: 27 U/L
BACTERIA: ABNORMAL
BASOPHILS # BLD: 0 % (ref 0–2)
BASOPHILS ABSOLUTE: 0.04 K/UL (ref 0–0.2)
BILIRUB SERPL-MCNC: 0.5 MG/DL (ref 0.3–1.2)
BILIRUBIN URINE: NEGATIVE
BUN BLDV-MCNC: 15 MG/DL (ref 6–20)
BUN/CREAT BLD: 18 (ref 9–20)
CALCIUM SERPL-MCNC: 9.9 MG/DL (ref 8.6–10.4)
CHLORIDE BLD-SCNC: 104 MMOL/L (ref 98–107)
CO2: 21 MMOL/L (ref 20–31)
COLOR: YELLOW
CREAT SERPL-MCNC: 0.82 MG/DL (ref 0.5–0.9)
EOSINOPHILS RELATIVE PERCENT: 2 % (ref 1–4)
EPITHELIAL CELLS UA: ABNORMAL /HPF (ref 0–5)
GFR AFRICAN AMERICAN: >60 ML/MIN
GFR NON-AFRICAN AMERICAN: >60 ML/MIN
GFR SERPL CREATININE-BSD FRML MDRD: ABNORMAL ML/MIN/{1.73_M2}
GLUCOSE BLD-MCNC: 107 MG/DL (ref 70–99)
GLUCOSE URINE: NEGATIVE
HCT VFR BLD CALC: 47.3 % (ref 36.3–47.1)
HEMOGLOBIN: 16.3 G/DL (ref 11.9–15.1)
IMMATURE GRANULOCYTES: 1 %
KETONES, URINE: NEGATIVE
LEUKOCYTE ESTERASE, URINE: ABNORMAL
LYMPHOCYTES # BLD: 24 % (ref 24–43)
MAGNESIUM: 2.1 MG/DL (ref 1.6–2.6)
MCH RBC QN AUTO: 30.3 PG (ref 25.2–33.5)
MCHC RBC AUTO-ENTMCNC: 34.5 G/DL (ref 28.4–34.8)
MCV RBC AUTO: 87.9 FL (ref 82.6–102.9)
MONOCYTES # BLD: 6 % (ref 3–12)
MUCUS: ABNORMAL
NITRITE, URINE: NEGATIVE
NRBC AUTOMATED: 0 PER 100 WBC
PDW BLD-RTO: 11.9 % (ref 11.8–14.4)
PH UA: 6 (ref 5–8)
PLATELET # BLD: 264 K/UL (ref 138–453)
PMV BLD AUTO: 10.6 FL (ref 8.1–13.5)
POTASSIUM SERPL-SCNC: 3.7 MMOL/L (ref 3.7–5.3)
PROTEIN UA: NEGATIVE
RBC # BLD: 5.38 M/UL (ref 3.95–5.11)
RBC UA: ABNORMAL /HPF (ref 0–2)
SEG NEUTROPHILS: 67 % (ref 36–65)
SEGMENTED NEUTROPHILS ABSOLUTE COUNT: 6.09 K/UL (ref 1.5–8.1)
SODIUM BLD-SCNC: 140 MMOL/L (ref 135–144)
SPECIFIC GRAVITY UA: 1.02 (ref 1–1.03)
TOTAL PROTEIN: 7.7 G/DL (ref 6.4–8.3)
TROPONIN, HIGH SENSITIVITY: <6 NG/L (ref 0–14)
TURBIDITY: ABNORMAL
URINE HGB: ABNORMAL
UROBILINOGEN, URINE: NORMAL
WBC # BLD: 9.1 K/UL (ref 3.5–11.3)
WBC UA: ABNORMAL /HPF (ref 0–5)

## 2022-09-01 PROCEDURE — 83735 ASSAY OF MAGNESIUM: CPT

## 2022-09-01 PROCEDURE — 2580000003 HC RX 258: Performed by: EMERGENCY MEDICINE

## 2022-09-01 PROCEDURE — 96374 THER/PROPH/DIAG INJ IV PUSH: CPT

## 2022-09-01 PROCEDURE — 6360000002 HC RX W HCPCS: Performed by: EMERGENCY MEDICINE

## 2022-09-01 PROCEDURE — 85025 COMPLETE CBC W/AUTO DIFF WBC: CPT

## 2022-09-01 PROCEDURE — 99285 EMERGENCY DEPT VISIT HI MDM: CPT

## 2022-09-01 PROCEDURE — 71101 X-RAY EXAM UNILAT RIBS/CHEST: CPT

## 2022-09-01 PROCEDURE — 93005 ELECTROCARDIOGRAM TRACING: CPT | Performed by: EMERGENCY MEDICINE

## 2022-09-01 PROCEDURE — 80053 COMPREHEN METABOLIC PANEL: CPT

## 2022-09-01 PROCEDURE — 84484 ASSAY OF TROPONIN QUANT: CPT

## 2022-09-01 PROCEDURE — 81001 URINALYSIS AUTO W/SCOPE: CPT

## 2022-09-01 PROCEDURE — 96361 HYDRATE IV INFUSION ADD-ON: CPT

## 2022-09-01 RX ORDER — KETOROLAC TROMETHAMINE 30 MG/ML
30 INJECTION, SOLUTION INTRAMUSCULAR; INTRAVENOUS ONCE
Status: COMPLETED | OUTPATIENT
Start: 2022-09-01 | End: 2022-09-01

## 2022-09-01 RX ORDER — 0.9 % SODIUM CHLORIDE 0.9 %
1000 INTRAVENOUS SOLUTION INTRAVENOUS ONCE
Status: COMPLETED | OUTPATIENT
Start: 2022-09-01 | End: 2022-09-01

## 2022-09-01 RX ADMIN — KETOROLAC TROMETHAMINE 30 MG: 30 INJECTION, SOLUTION INTRAMUSCULAR at 16:26

## 2022-09-01 RX ADMIN — SODIUM CHLORIDE 1000 ML: 9 INJECTION, SOLUTION INTRAVENOUS at 16:26

## 2022-09-01 ASSESSMENT — PAIN DESCRIPTION - ORIENTATION: ORIENTATION: LEFT

## 2022-09-01 ASSESSMENT — PAIN DESCRIPTION - DESCRIPTORS: DESCRIPTORS: SPASM

## 2022-09-01 ASSESSMENT — PAIN SCALES - GENERAL
PAINLEVEL_OUTOF10: 5
PAINLEVEL_OUTOF10: 8

## 2022-09-01 ASSESSMENT — PAIN - FUNCTIONAL ASSESSMENT
PAIN_FUNCTIONAL_ASSESSMENT: 0-10
PAIN_FUNCTIONAL_ASSESSMENT: 0-10

## 2022-09-01 ASSESSMENT — PAIN DESCRIPTION - FREQUENCY: FREQUENCY: CONTINUOUS

## 2022-09-01 NOTE — ED PROVIDER NOTES
EMERGENCY DEPARTMENT ENCOUNTER    Pt Name: Juli Hardy  MRN: [id number]  [place] 1970  Date of evaluation: 22  CHIEF COMPLAINT       Chief Complaint   Patient presents with    Dizziness     Onset today, states COVID a week ago    Loss of Consciousness     States passed out at [facility]    Chest Pain     On and off for 2 weeks, hx anxiety     HISTORY OF PRESENT ILLNESS   Patient is a 50 y.o. female who presents to the ED for evaluation of dizziness, blurred vision, and left flank pain. Patient states she was at [facility] this morning when she started to feel hot, dizzy, light-headed and became diaphoretic with blurred vision. The symptoms persisted at home in the setting of inoperable air conditioning which prompted her to call EMS. She reports that she checked her blood pressure which was roughly 680 systolic before EMS arrival. She reports that she does not stay hydrated and she drank 2 margaritas yesterday for her birthday,  She reports an extensive history of chronic pain. Additionally she states she is experiencing new onset left shoulder blade discomfort that started 2 weeks ago. Pain radiates to the left arm. She denies shortness of breath, chest pain, and loss of consciousness. REVIEW OF SYSTEMS     Review of Systems   Constitutional:  Positive for diaphoresis. Negative for appetite change. Eyes:  Positive for visual disturbance (Blurred vision). Neurological:  Positive for dizziness, light-headedness and headaches (Right side). All other systems reviewed and are negative.   PASTMEDICAL HISTORY     Past Medical History:   Diagnosis Date    Anxiety     Depression     Diabetes mellitus ([address]     Fibromyalgia     History of hyperparathyroidism     Hypertension     Migraine      SURGICAL HISTORY       Past Surgical History:   Procedure Laterality Date     SECTION      CHOLECYSTECTOMY      COCCYX REMOVAL      LITHOTRIPSY      SEPTOPLASTY      TEAR DUCT SURGERY Left     x2    TONSILLECTOMY

## 2022-09-02 LAB
EKG ATRIAL RATE: 89 BPM
EKG P AXIS: 28 DEGREES
EKG P-R INTERVAL: 138 MS
EKG Q-T INTERVAL: 370 MS
EKG QRS DURATION: 80 MS
EKG QTC CALCULATION (BAZETT): 450 MS
EKG R AXIS: 26 DEGREES
EKG T AXIS: 34 DEGREES
EKG VENTRICULAR RATE: 89 BPM

## 2022-09-17 ENCOUNTER — APPOINTMENT (OUTPATIENT)
Dept: GENERAL RADIOLOGY | Age: 52
End: 2022-09-17
Payer: MEDICARE

## 2022-09-17 ENCOUNTER — APPOINTMENT (OUTPATIENT)
Dept: CT IMAGING | Age: 52
End: 2022-09-17
Payer: MEDICARE

## 2022-09-17 ENCOUNTER — HOSPITAL ENCOUNTER (EMERGENCY)
Age: 52
Discharge: HOME | End: 2022-09-18
Attending: EMERGENCY MEDICINE
Payer: MEDICARE

## 2022-09-17 VITALS
OXYGEN SATURATION: 96 % | DIASTOLIC BLOOD PRESSURE: 69 MMHG | SYSTOLIC BLOOD PRESSURE: 150 MMHG | HEIGHT: 67 IN | TEMPERATURE: 97.9 F | WEIGHT: 213 LBS | HEART RATE: 70 BPM | BODY MASS INDEX: 33.43 KG/M2 | RESPIRATION RATE: 16 BRPM

## 2022-09-17 DIAGNOSIS — R42 DIZZINESS: ICD-10-CM

## 2022-09-17 DIAGNOSIS — N20.0 KIDNEY STONE: Primary | ICD-10-CM

## 2022-09-17 LAB
ABSOLUTE EOS #: 0.3 K/UL (ref 0–0.44)
ABSOLUTE IMMATURE GRANULOCYTE: 0.01 K/UL (ref 0–0.3)
ABSOLUTE LYMPH #: 2.36 K/UL (ref 1.1–3.7)
ABSOLUTE MONO #: 0.51 K/UL (ref 0.1–1.2)
ALBUMIN SERPL-MCNC: 4.3 G/DL (ref 3.5–5.2)
ALP BLD-CCNC: 78 U/L (ref 35–104)
ALT SERPL-CCNC: 20 U/L (ref 5–33)
AMORPHOUS: ABNORMAL
ANION GAP SERPL CALCULATED.3IONS-SCNC: 10 MMOL/L (ref 9–17)
AST SERPL-CCNC: 17 U/L
BACTERIA: ABNORMAL
BASOPHILS # BLD: 1 % (ref 0–2)
BASOPHILS ABSOLUTE: 0.06 K/UL (ref 0–0.2)
BILIRUB SERPL-MCNC: 0.2 MG/DL (ref 0.3–1.2)
BILIRUBIN DIRECT: 0.1 MG/DL
BILIRUBIN URINE: NEGATIVE
BILIRUBIN, INDIRECT: 0.1 MG/DL (ref 0–1)
BUN BLDV-MCNC: 8 MG/DL (ref 6–20)
BUN/CREAT BLD: 12 (ref 9–20)
CALCIUM SERPL-MCNC: 9.4 MG/DL (ref 8.6–10.4)
CHLORIDE BLD-SCNC: 105 MMOL/L (ref 98–107)
CO2: 26 MMOL/L (ref 20–31)
COLOR: YELLOW
CREAT SERPL-MCNC: 0.65 MG/DL (ref 0.5–0.9)
D-DIMER QUANTITATIVE: <0.27 MG/L FEU (ref 0–0.59)
EOSINOPHILS RELATIVE PERCENT: 4 % (ref 1–4)
EPITHELIAL CELLS UA: ABNORMAL /HPF (ref 0–5)
GFR AFRICAN AMERICAN: >60 ML/MIN
GFR NON-AFRICAN AMERICAN: >60 ML/MIN
GFR SERPL CREATININE-BSD FRML MDRD: ABNORMAL ML/MIN/{1.73_M2}
GLUCOSE BLD-MCNC: 84 MG/DL (ref 70–99)
GLUCOSE URINE: NEGATIVE
HCT VFR BLD CALC: 43.1 % (ref 36.3–47.1)
HEMOGLOBIN: 14.3 G/DL (ref 11.9–15.1)
IMMATURE GRANULOCYTES: 0 %
KETONES, URINE: NEGATIVE
LEUKOCYTE ESTERASE, URINE: ABNORMAL
LIPASE: 32 U/L (ref 13–60)
LYMPHOCYTES # BLD: 29 % (ref 24–43)
MAGNESIUM: 1.9 MG/DL (ref 1.6–2.6)
MCH RBC QN AUTO: 30.1 PG (ref 25.2–33.5)
MCHC RBC AUTO-ENTMCNC: 33.2 G/DL (ref 28.4–34.8)
MCV RBC AUTO: 90.7 FL (ref 82.6–102.9)
MONOCYTES # BLD: 6 % (ref 3–12)
NITRITE, URINE: NEGATIVE
NRBC AUTOMATED: 0 PER 100 WBC
PDW BLD-RTO: 12.7 % (ref 11.8–14.4)
PH UA: 7.5 (ref 5–8)
PLATELET # BLD: 268 K/UL (ref 138–453)
PMV BLD AUTO: 10.9 FL (ref 8.1–13.5)
POTASSIUM SERPL-SCNC: 3.1 MMOL/L (ref 3.7–5.3)
PROTEIN UA: NEGATIVE
RBC # BLD: 4.75 M/UL (ref 3.95–5.11)
RBC UA: ABNORMAL /HPF (ref 0–2)
SEG NEUTROPHILS: 60 % (ref 36–65)
SEGMENTED NEUTROPHILS ABSOLUTE COUNT: 4.91 K/UL (ref 1.5–8.1)
SODIUM BLD-SCNC: 141 MMOL/L (ref 135–144)
SPECIFIC GRAVITY UA: 1.01 (ref 1–1.03)
TOTAL PROTEIN: 7.2 G/DL (ref 6.4–8.3)
TURBIDITY: ABNORMAL
URINE HGB: NEGATIVE
UROBILINOGEN, URINE: NORMAL
WBC # BLD: 8.2 K/UL (ref 3.5–11.3)
WBC UA: ABNORMAL /HPF (ref 0–5)

## 2022-09-17 PROCEDURE — 81001 URINALYSIS AUTO W/SCOPE: CPT

## 2022-09-17 PROCEDURE — 99285 EMERGENCY DEPT VISIT HI MDM: CPT

## 2022-09-17 PROCEDURE — 80048 BASIC METABOLIC PNL TOTAL CA: CPT

## 2022-09-17 PROCEDURE — 6360000002 HC RX W HCPCS: Performed by: EMERGENCY MEDICINE

## 2022-09-17 PROCEDURE — 80076 HEPATIC FUNCTION PANEL: CPT

## 2022-09-17 PROCEDURE — 96375 TX/PRO/DX INJ NEW DRUG ADDON: CPT

## 2022-09-17 PROCEDURE — 96376 TX/PRO/DX INJ SAME DRUG ADON: CPT

## 2022-09-17 PROCEDURE — 6370000000 HC RX 637 (ALT 250 FOR IP): Performed by: EMERGENCY MEDICINE

## 2022-09-17 PROCEDURE — 2580000003 HC RX 258: Performed by: EMERGENCY MEDICINE

## 2022-09-17 PROCEDURE — 85025 COMPLETE CBC W/AUTO DIFF WBC: CPT

## 2022-09-17 PROCEDURE — 93005 ELECTROCARDIOGRAM TRACING: CPT | Performed by: EMERGENCY MEDICINE

## 2022-09-17 PROCEDURE — 71045 X-RAY EXAM CHEST 1 VIEW: CPT

## 2022-09-17 PROCEDURE — 83690 ASSAY OF LIPASE: CPT

## 2022-09-17 PROCEDURE — 85379 FIBRIN DEGRADATION QUANT: CPT

## 2022-09-17 PROCEDURE — 96374 THER/PROPH/DIAG INJ IV PUSH: CPT

## 2022-09-17 PROCEDURE — 74176 CT ABD & PELVIS W/O CONTRAST: CPT

## 2022-09-17 PROCEDURE — 83735 ASSAY OF MAGNESIUM: CPT

## 2022-09-17 RX ORDER — CIPROFLOXACIN 500 MG/1
500 TABLET, FILM COATED ORAL 2 TIMES DAILY
Qty: 14 TABLET | Refills: 0 | Status: SHIPPED | OUTPATIENT
Start: 2022-09-17 | End: 2022-09-24

## 2022-09-17 RX ORDER — 0.9 % SODIUM CHLORIDE 0.9 %
1000 INTRAVENOUS SOLUTION INTRAVENOUS ONCE
Status: COMPLETED | OUTPATIENT
Start: 2022-09-17 | End: 2022-09-17

## 2022-09-17 RX ORDER — ONDANSETRON 2 MG/ML
4 INJECTION INTRAMUSCULAR; INTRAVENOUS ONCE
Status: COMPLETED | OUTPATIENT
Start: 2022-09-17 | End: 2022-09-17

## 2022-09-17 RX ORDER — MECLIZINE HYDROCHLORIDE 25 MG/1
25 TABLET ORAL 3 TIMES DAILY PRN
Qty: 15 TABLET | Refills: 0 | Status: SHIPPED | OUTPATIENT
Start: 2022-09-17 | End: 2022-09-27

## 2022-09-17 RX ORDER — KETOROLAC TROMETHAMINE 30 MG/ML
30 INJECTION, SOLUTION INTRAMUSCULAR; INTRAVENOUS ONCE
Status: COMPLETED | OUTPATIENT
Start: 2022-09-17 | End: 2022-09-17

## 2022-09-17 RX ORDER — ONDANSETRON 4 MG/1
4 TABLET, ORALLY DISINTEGRATING ORAL EVERY 8 HOURS PRN
Qty: 20 TABLET | Refills: 0 | Status: SHIPPED | OUTPATIENT
Start: 2022-09-17

## 2022-09-17 RX ORDER — MECLIZINE HCL 12.5 MG/1
25 TABLET ORAL ONCE
Status: COMPLETED | OUTPATIENT
Start: 2022-09-17 | End: 2022-09-17

## 2022-09-17 RX ADMIN — MECLIZINE 25 MG: 12.5 TABLET ORAL at 22:55

## 2022-09-17 RX ADMIN — ONDANSETRON 4 MG: 2 INJECTION INTRAMUSCULAR; INTRAVENOUS at 19:52

## 2022-09-17 RX ADMIN — ONDANSETRON 4 MG: 2 INJECTION INTRAMUSCULAR; INTRAVENOUS at 22:55

## 2022-09-17 RX ADMIN — SODIUM CHLORIDE 1000 ML: 9 INJECTION, SOLUTION INTRAVENOUS at 19:48

## 2022-09-17 RX ADMIN — KETOROLAC TROMETHAMINE 30 MG: 30 INJECTION, SOLUTION INTRAMUSCULAR at 19:52

## 2022-09-17 ASSESSMENT — ENCOUNTER SYMPTOMS
COLOR CHANGE: 0
COUGH: 0
RHINORRHEA: 0
NAUSEA: 1
DIARRHEA: 0
VOMITING: 0
EYE REDNESS: 0
SORE THROAT: 0
EYE DISCHARGE: 0
SHORTNESS OF BREATH: 0

## 2022-09-17 ASSESSMENT — PAIN SCALES - GENERAL
PAINLEVEL_OUTOF10: 1
PAINLEVEL_OUTOF10: 5

## 2022-09-17 NOTE — ED PROVIDER NOTES
EMERGENCY DEPARTMENT ENCOUNTER    Pt Name: Chip Hughes  MRN: [id number]  [place] 1970  Date of evaluation: 9/17/22  CHIEF COMPLAINT       Chief Complaint   Patient presents with    Chest Pain    Nausea    Emesis    Sweats    Back Pain     HISTORY OF PRESENT ILLNESS   This is a 53-year-old female that presents with complaints of intermittent episodes of chest pain associated with dizziness, sweatiness, near syncope and some back pain. Patient states her symptoms been ongoing for the past couple weeks, she will have these episodes intermittently, she denies any known specific aggravating or alleviating factors. She describes her symptoms as severe. No previous history of cardiac disease, she has a history of hypertension. Does not take any blood thinners, no history of DVT or pulmonary embolism. REVIEW OF SYSTEMS     Review of Systems   Constitutional:  Positive for diaphoresis. Negative for chills and fever. HENT:  Negative for rhinorrhea and sore throat. Eyes:  Negative for discharge, redness and visual disturbance. Respiratory:  Negative for cough and shortness of breath. Cardiovascular:  Positive for chest pain. Negative for palpitations and leg swelling. Gastrointestinal:  Positive for nausea. Negative for diarrhea and vomiting. Musculoskeletal:  Negative for arthralgias, myalgias and neck pain. Skin:  Negative for color change and rash. Neurological:  Positive for dizziness. Negative for seizures, weakness and headaches. Psychiatric/Behavioral:  Negative for hallucinations, self-injury and suicidal ideas. PASTMEDICAL HISTORY     Past Medical History:   Diagnosis Date    Anxiety     Depression     Diabetes mellitus ([facility] [address]     Fibromyalgia     History of hyperparathyroidism     Hypertension     Migraine      Past Problem List  Patient Active Problem List   Diagnosis Code    Anxiety and depression F41.9, F32. A    Fibromyalgia M79.7    Essential hypertension I10    Mild intermittent asthma without complication Y98.24    MARIBEL (obstructive sleep apnea) G47.33    Irritable bowel syndrome with diarrhea K58.0    Chronic left-sided low back pain with left-sided sciatica M54.42, G89.29    Chronic migraine [id number]    GENET positive R76.8    Atopic rhinitis J30.9    Depression F32. A    Elevated C-reactive protein (CRP) R79.82    Kidney stones N20.0    History of ureter stent CSS5736    LPRD (laryngopharyngeal reflux disease) K21.9    Obesity (BMI 30-39. 9) E66.9    Singers' nodes J38.2    Vitamin D deficiency E55.9     SURGICAL HISTORY       Past Surgical History:   Procedure Laterality Date     SECTION      CHOLECYSTECTOMY      COCCYX REMOVAL      LITHOTRIPSY      SEPTOPLASTY      TEAR DUCT SURGERY Left     x2    TONSILLECTOMY      TUMOR REMOVAL      left posterior neck- benign    URETER STENT PLACEMENT      WISDOM TOOTH EXTRACTION       CURRENT MEDICATIONS       Previous Medications    ATRAC-TAIN 10 % CREAM    Apply qid to affected    BIOTIN 300 MCG TABS    Take 1 tablet by mouth daily    CLONAZEPAM (KLONOPIN) 0.5 MG TABLET    Take 1 tablet by mouth. DOXYCYCLINE (VIBRAMYCIN) 50 MG CAPSULE    Take 50 mg by mouth    DULOXETINE (CYMBALTA) 60 MG EXTENDED RELEASE CAPSULE        GABAPENTIN (NEURONTIN) 100 MG CAPSULE    take 1 capsule by mouth three times a day    GLYCOPYRROLATE (ROBINUL) 1 MG TABLET    take 1 tablet by mouth twice a day MAY INCREASE DOSE TO TWO tablets twice a day AFTER TWO WEEKS    HYDROXYCHLOROQUINE (PLAQUENIL) 200 MG TABLET    take 1 tablet by mouth twice a day for 1 month    IPRATROPIUM (ATROVENT) 0.03 % NASAL SPRAY        KETOROLAC TROMETHAMINE (TORADOL ORAL PO)    Take by mouth    LEVONORGESTREL-ETHINYL ESTRAD (LESSINA PO)    Take by mouth Take for 21 days    LIDOCAINE (LIDODERM) 5 %    Place 1 patch onto the skin daily 12 hours on, 12 hours off. METHYLPHENIDATE (RITALIN) 5 MG TABLET    Take 5 mg by mouth 2 times daily. Justin Rojas     METOPROLOL SUCCINATE (TOPROL XL) 25 MG EXTENDED RELEASE TABLET    Take 25 mg by mouth    METOPROLOL TARTRATE (LOPRESSOR) 25 MG TABLET    take 1 tablet by mouth twice a day    METRONIDAZOLE (METROGEL) 0.75 % GEL    apply to affected area twice a day ON CHEEKS    MONTELUKAST (SINGULAIR) 10 MG TABLET        NONFORMULARY    Compounding cream for feet    OMEPRAZOLE (PRILOSEC) 20 MG DELAYED RELEASE CAPSULE    Take 1 capsule by mouth    ONDANSETRON (ZOFRAN) 4 MG TABLET    Take 1 tablet by mouth daily as needed for Nausea or Vomiting    PROMETHAZINE (PHENERGAN) 25 MG TABLET    Take 1 tablet by mouth 3 times daily as needed for Nausea    PULMICORT FLEXHALER 180 MCG/ACT AEPB INHALER    inhale 2 puffs by mouth once daily    RA ALLERGY RELIEF 10 MG TABLET        RANITIDINE (ZANTAC) 150 MG CAPSULE        ROPINIROLE (REQUIP) 2 MG TABLET    take 1 tablet by mouth nightly    SERTRALINE (ZOLOFT) 100 MG TABLET    Take 1 tablet by mouth    TRAMADOL (ULTRAM) 50 MG TABLET    Take 50 mg by mouth as needed for Pain. Sherryle Moder GABY     is allergic to azithromycin, cefuroxime axetil, hydrocodone-acetaminophen, penicillins, sulfa antibiotics, bactrim [sulfamethoxazole-trimethoprim], dicyclomine, diphenhydramine, flomax  [tamsulosin hcl], lyrica [pregabalin], minocycline, other, penicillin g, percocet [oxycodone-acetaminophen], savella [milnacipran hcl], and silodosin. FAMILY HISTORY     She indicated that her mother is alive. She indicated that her father is alive. She indicated that her maternal grandmother is .      SOCIAL HISTORY       Social History     Tobacco Use    Smoking status: Never    Smokeless tobacco: Never   Vaping Use    Vaping Use: Never used   Substance Use Topics    Alcohol use: Yes     Comment: occasional    Drug use: No     PHYSICAL EXAM     INITIAL VITALS: BP (!) 150/69   Pulse 70   Temp 97.9 °F (36.6 °C) (Oral)   Resp 16   Ht 5' 7\" (1.702 m)   Wt 213 lb (96.6 kg)   SpO2 96%   BMI 33.36 kg/m²    Physical Exam  Constitutional:       Appearance: Normal appearance. She is well-developed. She is not ill-appearing or toxic-appearing. HENT:      Head: Normocephalic and atraumatic. Eyes:      Conjunctiva/sclera: Conjunctivae normal.      Pupils: Pupils are equal, round, and reactive to light. Neck:      Trachea: Trachea normal.   Cardiovascular:      Rate and Rhythm: Normal rate and regular rhythm. Heart sounds: S1 normal and S2 normal. No murmur heard. Pulmonary:      Effort: Pulmonary effort is normal. No accessory muscle usage or respiratory distress. Breath sounds: Normal breath sounds. Chest:      Chest wall: No deformity or tenderness. Abdominal:      General: Bowel sounds are normal. There is no distension or abdominal bruit. Palpations: Abdomen is not rigid. Tenderness: There is no abdominal tenderness. There is no guarding or rebound. Musculoskeletal:      Cervical back: Normal range of motion and neck supple. Skin:     General: Skin is warm. Findings: No rash. Neurological:      Mental Status: She is alert and oriented to person, place, and time. GCS: GCS eye subscore is 4. GCS verbal subscore is 5. GCS motor subscore is 6. Psychiatric:         Speech: Speech normal.       MEDICAL DECISION MAKIN-year-old presents with complaints of chest pain and near syncope and generally not feeling well. Plan is basic labs cardiac enzymes we will obtain a D-dimer to evaluate for possible PE versus thoracic aortic dissection though on clinical exam she does not have any findings suggestive of thoracic dissection. 11:34 PM EDT  Patient's ct shows a kidney stone. Planfor dc with antibiotics, pain control and fu with pcp as an outpatient. No sepsis.            CRITICAL CARE:       PROCEDURES:    Procedures    DIAGNOSTIC RESULTS   EKG:All EKG's are interpreted by the Emergency Department Physician who either signs or Co-signs this chart in the absence of a cardiologist.        RADIOLOGY:All plain film, CT, MRI, and formal ultrasound images (except ED bedside ultrasound) are read by the radiologist, see reports below, unless otherwisenoted in MDM or here. CT ABDOMEN PELVIS WO CONTRAST Additional Contrast? None   Final Result   1. Duplex left kidney with 3 mm calculus in the distal ureter of the left   lower moiety. No significant hydronephrosis. Additional bilateral calyceal   calculi. 2. Fatty liver. XR CHEST PORTABLE   Final Result   No acute process. LABS: All lab results were reviewed by myself, and all abnormals are listed below. Labs Reviewed   BASIC METABOLIC PANEL - Abnormal; Notable for the following components:       Result Value    Potassium 3.1 (*)     All other components within normal limits   HEPATIC FUNCTION PANEL - Abnormal; Notable for the following components:     Total Bilirubin 0.2 (*)     All other components within normal limits   URINALYSIS - Abnormal; Notable for the following components:    Turbidity UA SLIGHTLY CLOUDY (*)     Leukocyte Esterase, Urine TRACE (*)     All other components within normal limits   MICROSCOPIC URINALYSIS - Abnormal; Notable for the following components:    Bacteria, UA MODERATE (*)     Amorphous, UA 1+ (*)     All other components within normal limits   CBC WITH AUTO DIFFERENTIAL   D-DIMER, QUANTITATIVE   MAGNESIUM   LIPASE       EMERGENCY DEPARTMENTCOURSE:         Vitals:    Vitals:    09/17/22 1827 09/17/22 2158   BP: (!) 179/99 (!) 150/69   Pulse: 82 70   Resp: 16    Temp: 97.9 °F (36.6 °C)    TempSrc: Oral    SpO2: 99% 96%   Weight: 213 lb (96.6 kg)    Height: 5' 7\" (1.702 m)        The patient was given the following medications while in the emergency department:  Orders Placed This Encounter   Medications    ondansetron (ZOFRAN) injection 4 mg    ketorolac (TORADOL) injection 30 mg    0.9 % sodium chloride bolus    ondansetron (ZOFRAN) injection 4 mg    meclizine (ANTIVERT) tablet 25 mg    ondansetron (ZOFRAN ODT) 4 MG disintegrating tablet Sig: Take 1 tablet by mouth every 8 hours as needed for Nausea     Dispense:  20 tablet     Refill:  0    ciprofloxacin (CIPRO) 500 MG tablet     Sig: Take 1 tablet by mouth 2 times daily for 7 days     Dispense:  14 tablet     Refill:  0    meclizine (ANTIVERT) 25 MG tablet     Sig: Take 1 tablet by mouth 3 times daily as needed for Dizziness     Dispense:  15 tablet     Refill:  0     CONSULTS:  None    FINAL IMPRESSION      1. Kidney stone    2. Dizziness          DISPOSITION/PLAN   DISPOSITION Discharge - Pending Orders Complete 09/17/2022 11:31:31 PM      PATIENT REFERRED TO:  Edmond Fox MD  [address] [place] [address]    Schedule an appointment as soon as possible for a visit in 2 days    DISCHARGE MEDICATIONS:  New Prescriptions    CIPROFLOXACIN (CIPRO) 500 MG TABLET    Take 1 tablet by mouth 2 times daily for 7 days    MECLIZINE (ANTIVERT) 25 MG TABLET    Take 1 tablet by mouth 3 times daily as needed for Dizziness    ONDANSETRON (ZOFRAN ODT) 4 MG DISINTEGRATING TABLET    Take 1 tablet by mouth every 8 hours as needed for Nausea     The care is provided during an unprecedented national emergency due to the novel coronavirus, COVID 19.   MD Echo Johnson MD  09/17/22 [id number]

## 2022-09-18 NOTE — ED NOTES
Pt presents to ED with c/o chest pain, HTN, nausea, headache, and feeling like she is going to pass out. Pt states these symptoms have been ongoing for a while. Pt states she is a type 2 diabetic, has chronic fatigue syndrome and fibromyalgia. Pt states her body \"just hurts\", she states her BP was in the 473'B systolic, she feels nauseous, had a low grade fever at home, she's had diarrhea, chest pain that radiates \"all over\", generalized body aches. Pt states she had COVID approximately 4 weeks ago, pt took another test this afternoon that was negative, during assessment pt states the chest pain has since gone away. Pt A&O x4 and ambulatory.       Vini Franco, JASMINA  09/17/22 2016

## 2022-09-19 LAB
EKG ATRIAL RATE: 84 BPM
EKG P AXIS: 26 DEGREES
EKG P-R INTERVAL: 146 MS
EKG Q-T INTERVAL: 392 MS
EKG QRS DURATION: 76 MS
EKG QTC CALCULATION (BAZETT): 463 MS
EKG R AXIS: 48 DEGREES
EKG T AXIS: 45 DEGREES
EKG VENTRICULAR RATE: 84 BPM

## 2022-10-25 RX ORDER — GABAPENTIN 100 MG/1
CAPSULE ORAL
Qty: 90 CAPSULE | Refills: 1 | Status: SHIPPED | OUTPATIENT
Start: 2022-10-25 | End: 2022-11-24

## 2023-01-04 RX ORDER — GABAPENTIN 100 MG/1
CAPSULE ORAL
Qty: 90 CAPSULE | Refills: 1 | Status: SHIPPED | OUTPATIENT
Start: 2023-01-04 | End: 2023-02-03

## 2023-03-07 RX ORDER — GABAPENTIN 100 MG/1
CAPSULE ORAL
Qty: 90 CAPSULE | Refills: 1 | Status: SHIPPED | OUTPATIENT
Start: 2023-03-07 | End: 2023-04-06

## 2024-08-24 ENCOUNTER — HOSPITAL ENCOUNTER (EMERGENCY)
Age: 54
Discharge: HOME | End: 2024-08-24
Attending: EMERGENCY MEDICINE
Payer: MEDICAID

## 2024-08-24 VITALS
BODY MASS INDEX: 34.77 KG/M2 | HEART RATE: 92 BPM | DIASTOLIC BLOOD PRESSURE: 92 MMHG | WEIGHT: 222 LBS | RESPIRATION RATE: 16 BRPM | OXYGEN SATURATION: 97 % | SYSTOLIC BLOOD PRESSURE: 158 MMHG | TEMPERATURE: 97.9 F

## 2024-08-24 DIAGNOSIS — G43.009 MIGRAINE WITHOUT AURA AND WITHOUT STATUS MIGRAINOSUS, NOT INTRACTABLE: Primary | ICD-10-CM

## 2024-08-24 DIAGNOSIS — G62.9 PERIPHERAL POLYNEUROPATHY: ICD-10-CM

## 2024-08-24 LAB
ANION GAP SERPL CALCULATED.3IONS-SCNC: 12 MMOL/L (ref 9–17)
BASOPHILS # BLD: 0.06 K/UL (ref 0–0.2)
BASOPHILS NFR BLD: 1 % (ref 0–2)
BILIRUB UR QL STRIP: NEGATIVE
BUN SERPL-MCNC: 10 MG/DL (ref 6–20)
BUN/CREAT SERPL: 13 (ref 9–20)
CALCIUM SERPL-MCNC: 10.3 MG/DL (ref 8.6–10.4)
CHLORIDE SERPL-SCNC: 104 MMOL/L (ref 98–107)
CLARITY UR: ABNORMAL
CO2 SERPL-SCNC: 26 MMOL/L (ref 20–31)
COLOR UR: YELLOW
CREAT SERPL-MCNC: 0.8 MG/DL (ref 0.5–0.9)
EOSINOPHIL # BLD: 0.25 K/UL (ref 0–0.44)
EOSINOPHILS RELATIVE PERCENT: 3 % (ref 1–4)
EPI CELLS #/AREA URNS HPF: NORMAL /HPF (ref 0–5)
ERYTHROCYTE [DISTWIDTH] IN BLOOD BY AUTOMATED COUNT: 11.9 % (ref 11.8–14.4)
GFR, ESTIMATED: 89 ML/MIN/1.73M2
GLUCOSE SERPL-MCNC: 96 MG/DL (ref 70–99)
GLUCOSE UR STRIP-MCNC: NEGATIVE MG/DL
HCT VFR BLD AUTO: 45.4 % (ref 36.3–47.1)
HGB BLD-MCNC: 15.9 G/DL (ref 11.9–15.1)
HGB UR QL STRIP.AUTO: NEGATIVE
IMM GRANULOCYTES # BLD AUTO: 0.01 K/UL (ref 0–0.3)
IMM GRANULOCYTES NFR BLD: 0 %
KETONES UR STRIP-MCNC: NEGATIVE MG/DL
LEUKOCYTE ESTERASE UR QL STRIP: ABNORMAL
LYMPHOCYTES NFR BLD: 2.43 K/UL (ref 1.1–3.7)
LYMPHOCYTES RELATIVE PERCENT: 30 % (ref 24–43)
MCH RBC QN AUTO: 32.3 PG (ref 25.2–33.5)
MCHC RBC AUTO-ENTMCNC: 35 G/DL (ref 28.4–34.8)
MCV RBC AUTO: 92.3 FL (ref 82.6–102.9)
MONOCYTES NFR BLD: 0.54 K/UL (ref 0.1–1.2)
MONOCYTES NFR BLD: 7 % (ref 3–12)
NEUTROPHILS NFR BLD: 59 % (ref 36–65)
NEUTS SEG NFR BLD: 4.81 K/UL (ref 1.5–8.1)
NITRITE UR QL STRIP: NEGATIVE
NRBC BLD-RTO: 0 PER 100 WBC
PH UR STRIP: 6 [PH] (ref 5–8)
PLATELET # BLD AUTO: 230 K/UL (ref 138–453)
PMV BLD AUTO: 11.1 FL (ref 8.1–13.5)
POTASSIUM SERPL-SCNC: 3.9 MMOL/L (ref 3.7–5.3)
PROT UR STRIP-MCNC: NEGATIVE MG/DL
RBC # BLD AUTO: 4.92 M/UL (ref 3.95–5.11)
RBC #/AREA URNS HPF: NORMAL /HPF (ref 0–2)
SODIUM SERPL-SCNC: 142 MMOL/L (ref 135–144)
SP GR UR STRIP: 1.02 (ref 1–1.03)
UROBILINOGEN UR STRIP-ACNC: NORMAL EU/DL (ref 0–1)
WBC #/AREA URNS HPF: NORMAL /HPF (ref 0–5)
WBC OTHER # BLD: 8.1 K/UL (ref 3.5–11.3)

## 2024-08-24 PROCEDURE — 80048 BASIC METABOLIC PNL TOTAL CA: CPT

## 2024-08-24 PROCEDURE — 6360000002 HC RX W HCPCS: Performed by: EMERGENCY MEDICINE

## 2024-08-24 PROCEDURE — 96374 THER/PROPH/DIAG INJ IV PUSH: CPT

## 2024-08-24 PROCEDURE — 96361 HYDRATE IV INFUSION ADD-ON: CPT

## 2024-08-24 PROCEDURE — 96375 TX/PRO/DX INJ NEW DRUG ADDON: CPT

## 2024-08-24 PROCEDURE — 83036 HEMOGLOBIN GLYCOSYLATED A1C: CPT

## 2024-08-24 PROCEDURE — 85025 COMPLETE CBC W/AUTO DIFF WBC: CPT

## 2024-08-24 PROCEDURE — 81001 URINALYSIS AUTO W/SCOPE: CPT

## 2024-08-24 PROCEDURE — 99284 EMERGENCY DEPT VISIT MOD MDM: CPT

## 2024-08-24 PROCEDURE — 2580000003 HC RX 258: Performed by: EMERGENCY MEDICINE

## 2024-08-24 RX ORDER — ONDANSETRON 2 MG/ML
4 INJECTION INTRAMUSCULAR; INTRAVENOUS ONCE
Status: COMPLETED | OUTPATIENT
Start: 2024-08-24 | End: 2024-08-24

## 2024-08-24 RX ORDER — GABAPENTIN 100 MG/1
CAPSULE ORAL
Qty: 63 CAPSULE | Refills: 0 | Status: SHIPPED | OUTPATIENT
Start: 2024-08-24 | End: 2024-09-07

## 2024-08-24 RX ORDER — KETOROLAC TROMETHAMINE 15 MG/ML
15 INJECTION, SOLUTION INTRAMUSCULAR; INTRAVENOUS ONCE
Status: COMPLETED | OUTPATIENT
Start: 2024-08-24 | End: 2024-08-24

## 2024-08-24 RX ORDER — METOCLOPRAMIDE HYDROCHLORIDE 5 MG/ML
10 INJECTION INTRAMUSCULAR; INTRAVENOUS ONCE
Status: COMPLETED | OUTPATIENT
Start: 2024-08-24 | End: 2024-08-24

## 2024-08-24 RX ORDER — 0.9 % SODIUM CHLORIDE 0.9 %
1000 INTRAVENOUS SOLUTION INTRAVENOUS ONCE
Status: COMPLETED | OUTPATIENT
Start: 2024-08-24 | End: 2024-08-24

## 2024-08-24 RX ORDER — MORPHINE SULFATE 4 MG/ML
4 INJECTION, SOLUTION INTRAMUSCULAR; INTRAVENOUS ONCE
Status: COMPLETED | OUTPATIENT
Start: 2024-08-24 | End: 2024-08-24

## 2024-08-24 RX ORDER — DEXAMETHASONE SODIUM PHOSPHATE 4 MG/ML
4 INJECTION, SOLUTION INTRA-ARTICULAR; INTRALESIONAL; INTRAMUSCULAR; INTRAVENOUS; SOFT TISSUE ONCE
Status: COMPLETED | OUTPATIENT
Start: 2024-08-24 | End: 2024-08-24

## 2024-08-24 RX ORDER — DIPHENHYDRAMINE HYDROCHLORIDE 50 MG/ML
25 INJECTION INTRAMUSCULAR; INTRAVENOUS ONCE
Status: COMPLETED | OUTPATIENT
Start: 2024-08-24 | End: 2024-08-24

## 2024-08-24 RX ADMIN — KETOROLAC TROMETHAMINE 15 MG: 15 INJECTION, SOLUTION INTRAMUSCULAR; INTRAVENOUS at 17:42

## 2024-08-24 RX ADMIN — METOCLOPRAMIDE HYDROCHLORIDE 10 MG: 5 INJECTION INTRAMUSCULAR; INTRAVENOUS at 17:38

## 2024-08-24 RX ADMIN — MORPHINE SULFATE 4 MG: 4 INJECTION, SOLUTION INTRAMUSCULAR; INTRAVENOUS at 15:59

## 2024-08-24 RX ADMIN — SODIUM CHLORIDE 1000 ML: 9 INJECTION, SOLUTION INTRAVENOUS at 17:35

## 2024-08-24 RX ADMIN — ONDANSETRON 4 MG: 2 INJECTION INTRAMUSCULAR; INTRAVENOUS at 16:08

## 2024-08-24 RX ADMIN — DEXAMETHASONE SODIUM PHOSPHATE 4 MG: 4 INJECTION INTRA-ARTICULAR; INTRALESIONAL; INTRAMUSCULAR; INTRAVENOUS; SOFT TISSUE at 17:45

## 2024-08-24 RX ADMIN — SODIUM CHLORIDE 1000 ML: 9 INJECTION, SOLUTION INTRAVENOUS at 15:57

## 2024-08-24 RX ADMIN — DIPHENHYDRAMINE HYDROCHLORIDE 25 MG: 50 INJECTION INTRAMUSCULAR; INTRAVENOUS at 17:47

## 2024-08-24 ASSESSMENT — PAIN DESCRIPTION - LOCATION: LOCATION: HEAD

## 2024-08-24 ASSESSMENT — PAIN SCALES - GENERAL
PAINLEVEL_OUTOF10: 7
PAINLEVEL_OUTOF10: 8
PAINLEVEL_OUTOF10: 2

## 2024-08-24 ASSESSMENT — PAIN DESCRIPTION - DESCRIPTORS: DESCRIPTORS: ACHING

## 2024-08-24 ASSESSMENT — PAIN - FUNCTIONAL ASSESSMENT: PAIN_FUNCTIONAL_ASSESSMENT: 0-10

## 2024-08-24 NOTE — ED PROVIDER NOTES
EMERGENCY DEPARTMENT ENCOUNTER    Pt Name: Kaylee Stewart  MRN: [id number]  Birthdate 1970  Date of evaluation: 8/24/24  CHIEF COMPLAINT       Chief Complaint   Patient presents with    Peripheral Neuropathy     Generalized, no known injury; reports possible hx of DM or insulin intolerance    Headache     Previous hx of migraines, states this is not typical presentation for her     HISTORY OF PRESENT ILLNESS   The history is provided by the patient and medical records.  The patient is a 53-year-old female with history of anxiety, depression, diabetes, fibromyalgia, hyperparathyroidism, hypertension and migraines who presents to the ED for headache and peripheral neuropathy.  Symptoms started approximately 2 weeks ago.  She reports burning pain in feet legs and arms bilaterally.  She has been taking tramadol and ketorolac with minimal relief.  She is followed by pain management and has an appointment scheduled for early September.  She states she was told she was insulin resistant and was taking metformin however was told to stop taking metformin.  She states her hemoglobin A1c has not been checked recently.    REVIEW OF SYSTEMS     Review of Systems  All other systems reviewed and are negative.    PASTMEDICAL HISTORY     Past Medical History:   Diagnosis Date    Anxiety     Depression     Diabetes mellitus (HCC)     Fibromyalgia     History of hyperparathyroidism     Hypertension     Migraine      Past Problem List  Patient Active Problem List   Diagnosis Code    Anxiety and depression F41.9, F32.A    Fibromyalgia M79.7    Essential hypertension I10    Mild intermittent asthma without complication J45.20    MARIBEL (obstructive sleep apnea) G47.33    Irritable bowel syndrome with diarrhea K58.0    Chronic left-sided low back pain with left-sided sciatica M54.42, G89.29    Chronic migraine [id number]    GENET positive R76.8    Atopic rhinitis J30.9    Depression F32.A    Elevated C-reactive protein (CRP) R79.82    Kidney  dry.   Neurological:      Mental Status: Patient is alert. Mental status is at baseline.     MEDICAL DECISION MAKING / ED COURSE:     1)  Number and Complexity of Problems  Problem List This Visit: Migraine, peripheral neuropathy    Differential Diagnosis: Acute on chronic pain syndrome, acute on chronic migraine headache, poorly controlled diabetes, diabetic neuropathy.    Diagnoses Considered but Do Not Suspect: Cauda equina, epidural abscess, space-occupying lesion, CVA    Pertinent Comorbid Conditions: Anxiety, depression, diabetes, fibromyalgia, hyperparathyroidism, hypertension and migraines    2)  Data Reviewed  Patient's EKG independently interpreted by me: N/A    Decision Rules/Scores utilized:  N/A    HEART SCORE: N/A, no chest pain.    NIH STROKE SCALE: N/A, no focal neurodeficits.     External Documents Reviewed: I have independently reviewed patient's previous medical records including labs, notes and imaging.    Tests considered but not ordered and why:  N/A    Imaging that is independently reviewed and interpreted by me as: N/A    See more data below for the lab and radiology tests and orders.    3)  Treatment and Disposition    Patient repeat assessment: Patient is stable, pain medical improvement is morphine and Zofran.  Headache and peripheral neuropathy still present.  Now treated for migraine with Decadron, Toradol, Benadryl and Reglan.    \"ED Course\" Notes From Epic Narrator:     Patient care signed out to Dr. Goldberger.  Refer to her note for diagnosis and disposition.      CRITICAL CARE:   N/A    PROCEDURES:  Procedures      DATA FOR LAB AND RADIOLOGY TESTS ORDERED BELOW ARE REVIEWED BY THE ED CLINICIAN:    RADIOLOGY: All x-rays, CT, MRI, and formal ultrasound images (except ED bedside ultrasound) are read by the radiologist, see reports below, unless otherwise noted in MDM or here.  Reports below are reviewed by myself.  No orders to display       LABS: Lab orders shown below, the results

## 2024-08-24 NOTE — DISCHARGE INSTRUCTIONS
Take gabapentin as prescribed.  This medication takes some time to be effective in your body.  We increased dosage after a week.  I do highly recommend follow-up with your doctor regarding continued management with this medication.

## 2024-08-24 NOTE — ED PROVIDER NOTES
EMERGENCY DEPARTMENT ENCOUNTER    Pt Name: Kaylee Stewart  MRN: [id number]  Birthdate 1970  Date of evaluation: 24  CHIEF COMPLAINT       Chief Complaint   Patient presents with    Peripheral Neuropathy     Generalized, no known injury; reports possible hx of DM or insulin intolerance    Headache     Previous hx of migraines, states this is not typical presentation for her       PASTMEDICAL HISTORY     Past Medical History:   Diagnosis Date    Anxiety     Depression     Diabetes mellitus (HCC)     Fibromyalgia     History of hyperparathyroidism     Hypertension     Migraine      Past Problem List  Patient Active Problem List   Diagnosis Code    Anxiety and depression F41.9, F32.A    Fibromyalgia M79.7    Essential hypertension I10    Mild intermittent asthma without complication J45.20    MARIBEL (obstructive sleep apnea) G47.33    Irritable bowel syndrome with diarrhea K58.0    Chronic left-sided low back pain with left-sided sciatica M54.42, G89.29    Chronic migraine [id number]    GENET positive R76.8    Atopic rhinitis J30.9    Depression F32.A    Elevated C-reactive protein (CRP) R79.82    Kidney stones N20.0    History of ureter stent [id number]    LPRD (laryngopharyngeal reflux disease) K21.9    Obesity (BMI 30-39.9) E66.9    Singers' nodes J38.2    Vitamin D deficiency E55.9     SURGICAL HISTORY       Past Surgical History:   Procedure Laterality Date     SECTION      CHOLECYSTECTOMY      COCCYX REMOVAL      LITHOTRIPSY      SEPTOPLASTY      TEAR DUCT SURGERY Left     x2    TONSILLECTOMY      TUMOR REMOVAL      left posterior neck- benign    URETER STENT PLACEMENT      WISDOM TOOTH EXTRACTION       CURRENT MEDICATIONS       Previous Medications    AMLODIPINE (NORVASC) 5 MG TABLET    take 1 tablet by mouth once daily    BUPROPION HBR (APLENZIN) 174 MG TB24    take 1 tablet by mouth once daily for 4 days then TAKE 348MG TABLET    CLONAZEPAM (KLONOPIN) 0.5 MG TABLET    Take 1 tablet by mouth.     listed below.  Labs Reviewed   CBC WITH AUTO DIFFERENTIAL - Abnormal; Notable for the following components:       Result Value    Hemoglobin 15.9 (*)     MCHC 35.0 (*)     All other components within normal limits   URINALYSIS - Abnormal; Notable for the following components:    Turbidity UA SLIGHTLY CLOUDY (*)     Leukocyte Esterase, Urine TRACE (*)     All other components within normal limits   BASIC METABOLIC PANEL   MICROSCOPIC URINALYSIS   HEMOGLOBIN A1C       Vitals Reviewed:    Vitals:    08/24/24 1308 08/24/24 1559 08/24/24 1815   BP: (!) 183/89  (!) 158/92   Pulse: 92     Resp: 16 16    Temp: 97.9 °F (36.6 °C)     TempSrc: Oral     SpO2: 97%  97%   Weight: 100.7 kg (222 lb)       MEDICATIONS GIVEN TO PATIENT THIS ENCOUNTER:  Orders Placed This Encounter   Medications    sodium chloride 0.9 % bolus 1,000 mL    morphine sulfate (PF) injection 4 mg    ondansetron (ZOFRAN) injection 4 mg    sodium chloride 0.9 % bolus 1,000 mL    dexAMETHasone (DECADRON) injection 4 mg    ketorolac (TORADOL) injection 15 mg    metoclopramide (REGLAN) injection 10 mg    diphenhydrAMINE (BENADRYL) injection 25 mg    gabapentin (NEURONTIN) 100 MG capsule     Sig: Take 1 capsule by mouth 3 times daily for 7 days, THEN 3 capsules in the morning and at bedtime for 7 days.     Dispense:  63 capsule     Refill:  0     DISCHARGE PRESCRIPTIONS:  New Prescriptions    GABAPENTIN (NEURONTIN) 100 MG CAPSULE    Take 1 capsule by mouth 3 times daily for 7 days, THEN 3 capsules in the morning and at bedtime for 7 days.     PHYSICIAN CONSULTS ORDERED THIS ENCOUNTER:  None  FINAL IMPRESSION      1. Migraine without aura and without status migrainosus, not intractable    2. Peripheral polyneuropathy          DISPOSITION/PLAN   DISPOSITION Decision To Discharge 08/24/2024 06:46:22 PM  Condition at Disposition: Stable      OUTPATIENT FOLLOW UP THE PATIENT:  Cristian Adams MD  [address] [place] [id number] [phone]    Schedule

## 2024-08-25 LAB
EST. AVERAGE GLUCOSE BLD GHB EST-MCNC: 111 MG/DL
HBA1C MFR BLD: 5.5 % (ref 4–6)

## 2025-02-02 ENCOUNTER — HOSPITAL ENCOUNTER (EMERGENCY)
Age: 55
Discharge: HOME | End: 2025-02-02
Attending: STUDENT IN AN ORGANIZED HEALTH CARE EDUCATION/TRAINING PROGRAM
Payer: MEDICAID

## 2025-02-02 ENCOUNTER — APPOINTMENT (OUTPATIENT)
Dept: GENERAL RADIOLOGY | Age: 55
End: 2025-02-02
Payer: MEDICAID

## 2025-02-02 VITALS
RESPIRATION RATE: 22 BRPM | HEIGHT: 67 IN | WEIGHT: 220 LBS | TEMPERATURE: 98.1 F | BODY MASS INDEX: 34.53 KG/M2 | OXYGEN SATURATION: 94 % | HEART RATE: 86 BPM | SYSTOLIC BLOOD PRESSURE: 160 MMHG | DIASTOLIC BLOOD PRESSURE: 84 MMHG

## 2025-02-02 DIAGNOSIS — M79.672 LEFT FOOT PAIN: Primary | ICD-10-CM

## 2025-02-02 DIAGNOSIS — M54.9 BILATERAL BACK PAIN, UNSPECIFIED BACK LOCATION, UNSPECIFIED CHRONICITY: ICD-10-CM

## 2025-02-02 DIAGNOSIS — B34.9 VIRAL ILLNESS: ICD-10-CM

## 2025-02-02 LAB
ALBUMIN SERPL-MCNC: 4.4 G/DL (ref 3.5–5.2)
ALBUMIN/GLOB SERPL: 1.5 {RATIO} (ref 1–2.5)
ALP SERPL-CCNC: 85 U/L (ref 35–104)
ALT SERPL-CCNC: 62 U/L (ref 10–35)
ANION GAP SERPL CALCULATED.3IONS-SCNC: 16 MMOL/L (ref 9–16)
AST SERPL-CCNC: 42 U/L (ref 10–35)
BACTERIA URNS QL MICRO: ABNORMAL
BASOPHILS # BLD: 0.05 K/UL (ref 0–0.2)
BASOPHILS NFR BLD: 1 % (ref 0–2)
BILIRUB DIRECT SERPL-MCNC: 0.2 MG/DL (ref 0–0.2)
BILIRUB INDIRECT SERPL-MCNC: 0.2 MG/DL
BILIRUB SERPL-MCNC: 0.4 MG/DL (ref 0–1.2)
BILIRUB UR QL STRIP: NEGATIVE
BUN SERPL-MCNC: 14 MG/DL (ref 6–20)
CALCIUM SERPL-MCNC: 9.8 MG/DL (ref 8.6–10.4)
CHLORIDE SERPL-SCNC: 103 MMOL/L (ref 98–107)
CLARITY UR: CLEAR
CO2 SERPL-SCNC: 22 MMOL/L (ref 20–31)
COLOR UR: YELLOW
CREAT SERPL-MCNC: 0.9 MG/DL (ref 0.5–0.9)
EOSINOPHIL # BLD: 0.17 K/UL (ref 0–0.44)
EOSINOPHILS RELATIVE PERCENT: 2 % (ref 1–4)
EPI CELLS #/AREA URNS HPF: ABNORMAL /HPF (ref 0–5)
ERYTHROCYTE [DISTWIDTH] IN BLOOD BY AUTOMATED COUNT: 12.1 % (ref 11.8–14.4)
FLUAV RNA RESP QL NAA+PROBE: NOT DETECTED
FLUBV RNA RESP QL NAA+PROBE: NOT DETECTED
GFR, ESTIMATED: 74 ML/MIN/1.73M2
GLUCOSE SERPL-MCNC: 171 MG/DL (ref 74–99)
GLUCOSE UR STRIP-MCNC: NEGATIVE MG/DL
HCG UR QL: NEGATIVE
HCT VFR BLD AUTO: 48.2 % (ref 36.3–47.1)
HGB BLD-MCNC: 17 G/DL (ref 11.9–15.1)
HGB UR QL STRIP.AUTO: ABNORMAL
IMM GRANULOCYTES # BLD AUTO: 0.01 K/UL (ref 0–0.3)
IMM GRANULOCYTES NFR BLD: 0 %
KETONES UR STRIP-MCNC: NEGATIVE MG/DL
LEUKOCYTE ESTERASE UR QL STRIP: ABNORMAL
LIPASE SERPL-CCNC: 29 U/L (ref 13–60)
LYMPHOCYTES NFR BLD: 2.78 K/UL (ref 1.1–3.7)
LYMPHOCYTES RELATIVE PERCENT: 34 % (ref 24–43)
MCH RBC QN AUTO: 31.6 PG (ref 25.2–33.5)
MCHC RBC AUTO-ENTMCNC: 35.3 G/DL (ref 28.4–34.8)
MCV RBC AUTO: 89.6 FL (ref 82.6–102.9)
MONOCYTES NFR BLD: 0.45 K/UL (ref 0.1–1.2)
MONOCYTES NFR BLD: 6 % (ref 3–12)
MUCOUS THREADS URNS QL MICRO: ABNORMAL
NEUTROPHILS NFR BLD: 57 % (ref 36–65)
NEUTS SEG NFR BLD: 4.64 K/UL (ref 1.5–8.1)
NITRITE UR QL STRIP: NEGATIVE
NRBC BLD-RTO: 0 PER 100 WBC
PH UR STRIP: 6 [PH] (ref 5–8)
PLATELET # BLD AUTO: 286 K/UL (ref 138–453)
PMV BLD AUTO: 10.7 FL (ref 8.1–13.5)
POTASSIUM SERPL-SCNC: 3.6 MMOL/L (ref 3.7–5.3)
PROT SERPL-MCNC: 7.4 G/DL (ref 6.6–8.7)
PROT UR STRIP-MCNC: NEGATIVE MG/DL
RBC # BLD AUTO: 5.38 M/UL (ref 3.95–5.11)
RBC #/AREA URNS HPF: ABNORMAL /HPF (ref 0–2)
SARS-COV-2 RNA RESP QL NAA+PROBE: NOT DETECTED
SODIUM SERPL-SCNC: 140 MMOL/L (ref 136–145)
SOURCE: NORMAL
SP GR UR STRIP: 1.02 (ref 1–1.03)
SPECIMEN DESCRIPTION: NORMAL
UROBILINOGEN UR STRIP-ACNC: NORMAL EU/DL (ref 0–1)
WBC #/AREA URNS HPF: ABNORMAL /HPF (ref 0–5)
WBC OTHER # BLD: 8.1 K/UL (ref 3.5–11.3)

## 2025-02-02 PROCEDURE — 99284 EMERGENCY DEPT VISIT MOD MDM: CPT

## 2025-02-02 PROCEDURE — 81025 URINE PREGNANCY TEST: CPT

## 2025-02-02 PROCEDURE — 96374 THER/PROPH/DIAG INJ IV PUSH: CPT

## 2025-02-02 PROCEDURE — 83690 ASSAY OF LIPASE: CPT

## 2025-02-02 PROCEDURE — 73630 X-RAY EXAM OF FOOT: CPT

## 2025-02-02 PROCEDURE — 87636 SARSCOV2 & INF A&B AMP PRB: CPT

## 2025-02-02 PROCEDURE — 85025 COMPLETE CBC W/AUTO DIFF WBC: CPT

## 2025-02-02 PROCEDURE — 93005 ELECTROCARDIOGRAM TRACING: CPT

## 2025-02-02 PROCEDURE — 6370000000 HC RX 637 (ALT 250 FOR IP): Performed by: NURSE PRACTITIONER

## 2025-02-02 PROCEDURE — 81001 URINALYSIS AUTO W/SCOPE: CPT

## 2025-02-02 PROCEDURE — 6360000002 HC RX W HCPCS: Performed by: NURSE PRACTITIONER

## 2025-02-02 PROCEDURE — 80076 HEPATIC FUNCTION PANEL: CPT

## 2025-02-02 PROCEDURE — 96375 TX/PRO/DX INJ NEW DRUG ADDON: CPT

## 2025-02-02 PROCEDURE — 80048 BASIC METABOLIC PNL TOTAL CA: CPT

## 2025-02-02 PROCEDURE — 71045 X-RAY EXAM CHEST 1 VIEW: CPT

## 2025-02-02 PROCEDURE — 87086 URINE CULTURE/COLONY COUNT: CPT

## 2025-02-02 RX ORDER — KETOROLAC TROMETHAMINE 30 MG/ML
30 INJECTION, SOLUTION INTRAMUSCULAR; INTRAVENOUS ONCE
Status: COMPLETED | OUTPATIENT
Start: 2025-02-02 | End: 2025-02-02

## 2025-02-02 RX ORDER — ONDANSETRON 4 MG/1
4 TABLET, FILM COATED ORAL EVERY 8 HOURS PRN
Qty: 12 TABLET | Refills: 0 | Status: SHIPPED | OUTPATIENT
Start: 2025-02-02

## 2025-02-02 RX ORDER — NAPROXEN 500 MG/1
500 TABLET ORAL 2 TIMES DAILY PRN
Qty: 14 TABLET | Refills: 0 | Status: SHIPPED | OUTPATIENT
Start: 2025-02-02

## 2025-02-02 RX ORDER — METAXALONE 800 MG/1
800 TABLET ORAL ONCE
Status: COMPLETED | OUTPATIENT
Start: 2025-02-02 | End: 2025-02-02

## 2025-02-02 RX ORDER — ONDANSETRON 2 MG/ML
4 INJECTION INTRAMUSCULAR; INTRAVENOUS ONCE
Status: COMPLETED | OUTPATIENT
Start: 2025-02-02 | End: 2025-02-02

## 2025-02-02 RX ADMIN — METAXALONE 800 MG: 800 TABLET ORAL at 22:08

## 2025-02-02 RX ADMIN — KETOROLAC TROMETHAMINE 30 MG: 30 INJECTION, SOLUTION INTRAMUSCULAR at 20:47

## 2025-02-02 RX ADMIN — ONDANSETRON 4 MG: 2 INJECTION INTRAMUSCULAR; INTRAVENOUS at 20:47

## 2025-02-02 ASSESSMENT — PAIN - FUNCTIONAL ASSESSMENT: PAIN_FUNCTIONAL_ASSESSMENT: 0-10

## 2025-02-02 ASSESSMENT — PAIN SCALES - GENERAL
PAINLEVEL_OUTOF10: 9
PAINLEVEL_OUTOF10: 10

## 2025-02-03 LAB
EKG ATRIAL RATE: 89 BPM
EKG P AXIS: 0 DEGREES
EKG P-R INTERVAL: 134 MS
EKG Q-T INTERVAL: 376 MS
EKG QRS DURATION: 84 MS
EKG QTC CALCULATION (BAZETT): 457 MS
EKG R AXIS: 15 DEGREES
EKG T AXIS: 19 DEGREES
EKG VENTRICULAR RATE: 89 BPM

## 2025-02-03 NOTE — ED PROVIDER NOTES
Team [facility] EMERGENCY DEPARTMENT  eMERGENCY dEPARTMENT eNCOUnter      Pt Name: Kaylee Stewart  MRN: [id number]  Birthdate 1970  Date of evaluation: 2/2/2025  Provider: KEVIN Jerome CNP    CHIEF COMPLAINT       Chief Complaint   Patient presents with    Back Pain     Upper back pain x4-5 days    Chest Pain     Along with bilateral arm numbness, left arm is worse    Toe Pain     L big toe. Saw her PCP 4 days ago and was prescribed ATBs, PCP wanted her to get x-ray but she was unable to         HISTORY OF PRESENT ILLNESS  (Location/Symptom, Timing/Onset, Context/Setting, Quality, Duration, Modifying Factors, Severity.)   Kaylee Stewart is a 54 y.o. female who presents to the emergency department. C/o cough, congestion, rhinorrhea, upper back pain, N/V, body aches. Onset was a few days ago. Also reports pain to her left great toe along the edge of the nail. Her pcp placed her on keflex 4 days ago. Denies fever, chills, urinary sx. Her significant other has pneumonia. Rates her pain 9/10. Denies recent travel or surgery.      Nursing Notes were reviewed.    ALLERGIES     Azithromycin, Cefuroxime axetil, Hydrocodone-acetaminophen, Penicillins, Sulfa antibiotics, Bactrim [sulfamethoxazole-trimethoprim], Dicyclomine, Diphenhydramine, Flomax  [tamsulosin hcl], Lyrica [pregabalin], Minocycline, Other, Penicillin g, Percocet [oxycodone-acetaminophen], Savella [milnacipran hcl], and Silodosin    CURRENT MEDICATIONS       Discharge Medication List as of 2/2/2025 10:02 PM        CONTINUE these medications which have NOT CHANGED    Details   metoprolol tartrate (LOPRESSOR) 25 MG tablet take 1 tablet by mouth twice a day, Disp-60 tablet, R-11Normal      progesterone (PROMETRIUM) 100 MG CAPS capsule Take 1 capsule by mouthHistorical Med      rOPINIRole (REQUIP) 2 MG tablet take 1 tablet by mouth nightly, Disp-90 tablet, R-0Normal      omeprazole (PRILOSEC) 20 MG delayed release capsule Take 1

## 2025-02-03 NOTE — ED NOTES
Pt presents to ED with c/o CP, upper back pain, and L great toe pain. Pt stated pain in her back has been ongoing for 4-5 days. Pt denies any injury or trauma. Pt stated it feels as if something is inside her back and continuously stretching. Pt reports she is having left sided chest pressure . Pt reports numbness to both arms, left being the worst. Pt is on abx for infection to L great toe. Pt stated she only has taken 2 days worth and has been vomiting d/t the meds.

## 2025-02-04 LAB
MICROORGANISM SPEC CULT: NORMAL
SERVICE CMNT-IMP: NORMAL
SPECIMEN DESCRIPTION: NORMAL

## 2025-02-12 ENCOUNTER — OFFICE VISIT (OUTPATIENT)
Dept: PODIATRY | Age: 55
End: 2025-02-12
Payer: MEDICAID

## 2025-02-12 VITALS — HEIGHT: 67 IN | BODY MASS INDEX: 34.53 KG/M2 | WEIGHT: 220 LBS

## 2025-02-12 DIAGNOSIS — B35.1 DERMATOPHYTOSIS OF NAIL: Primary | ICD-10-CM

## 2025-02-12 DIAGNOSIS — M79.7 FIBROMYALGIA: ICD-10-CM

## 2025-02-12 DIAGNOSIS — M79.604 PAIN IN BOTH LOWER EXTREMITIES: ICD-10-CM

## 2025-02-12 DIAGNOSIS — M79.605 PAIN IN BOTH LOWER EXTREMITIES: ICD-10-CM

## 2025-02-12 DIAGNOSIS — G60.9 IDIOPATHIC PERIPHERAL NEUROPATHY: ICD-10-CM

## 2025-02-12 PROCEDURE — 99214 OFFICE O/P EST MOD 30 MIN: CPT | Performed by: PODIATRIST

## 2025-02-12 PROCEDURE — 11721 DEBRIDE NAIL 6 OR MORE: CPT | Performed by: PODIATRIST

## 2025-02-12 RX ORDER — BENZOYL PEROXIDE 100 MG/ML
LIQUID TOPICAL
COMMUNITY
Start: 2024-06-05

## 2025-02-12 RX ORDER — ECHINACEA PURPUREA EXTRACT 125 MG
TABLET ORAL
COMMUNITY

## 2025-02-12 RX ORDER — LIDOCAINE 50 MG/G
1 OINTMENT TOPICAL DAILY PRN
COMMUNITY
Start: 2024-09-05

## 2025-02-12 RX ORDER — UREA 40 %
CREAM (GRAM) TOPICAL
Qty: 120 G | Refills: 2 | Status: SHIPPED | OUTPATIENT
Start: 2025-02-12

## 2025-02-12 RX ORDER — FEZOLINETANT 45 MG/1
TABLET, FILM COATED ORAL
COMMUNITY
Start: 2023-12-16

## 2025-02-12 RX ORDER — NYSTATIN AND TRIAMCINOLONE ACETONIDE 100000; 1 [USP'U]/G; MG/G
1 CREAM TOPICAL
COMMUNITY
Start: 2024-02-22

## 2025-02-12 RX ORDER — DIAZEPAM 5 MG/1
5 TABLET ORAL DAILY PRN
COMMUNITY
Start: 2024-09-11

## 2025-02-12 RX ORDER — TRIAMCINOLONE ACETONIDE 5 MG/G
1 OINTMENT TOPICAL
COMMUNITY
Start: 2024-03-11

## 2025-02-12 RX ORDER — TACROLIMUS 1 MG/G
OINTMENT TOPICAL
COMMUNITY
Start: 2024-06-05

## 2025-02-12 RX ORDER — HYDROCORTISONE 25 MG/G
CREAM TOPICAL
COMMUNITY
Start: 2024-04-25

## 2025-02-17 NOTE — PROGRESS NOTES
[facility], [facility] PODI[facility]RY [address]  [address]  [place] [id number]  Dept: [phone]  Dept Fax: [phone]    DIABETIC PROGRESS NOTE  Date of patient's visit: 2/17/2025  Patient's Name:  Kaylee Stewart YOB: 1970            Patient Care Team:  Cristian Adams MD as PCP - General (Family Medicine)  Sveta Gomez DPM as Physician (Podiatry)  Juan Brantley MD as Consulting Physician (Infectious Diseases)          Chief Complaint   Patient presents with    Toe Pain     Left great toe is very painful and burning x 2 weeks was seen at [facility] on 2/2 for not feeling well but left great toe was address and given antibiotics for possible toe infection.     Foot Pain     Bilateral foot pain does have plantar fascitis     Peripheral Neuropathy     Bilateral feet         Subjective:   Kaylee Stewart comes to clinic for Toe Pain (Left great toe is very painful and burning x 2 weeks was seen at [facility] on 2/2 for not feeling well but left great toe was address and given antibiotics for possible toe infection. ), Foot Pain (Bilateral foot pain does have plantar fascitis ), and Peripheral Neuropathy (Bilateral feet  )    she is a diabetic and states that feet are painful.  Pt currently has complaint of thickened, elongated nails that they cannot manage by themselves.   Pt's primary care physician is Cristian Adams MD Pt has a new complaint of of increased pain to left great toe.  She denies any injury or trauma to the foot pt has tried changing shoes but it has not helped the pain       Lab Results   Component Value Date    LABA1C 5.5 08/24/2024      Complains of numbness in the feet bilat.  Past Medical History:   Diagnosis Date    Anxiety     Depression     Diabetes mellitus (HCC)     Fibromyalgia     History of hyperparathyroidism     Hypertension     Migraine        Allergies   Allergen Reactions    Azithromycin Hives

## 2025-03-20 VITALS — HEIGHT: 67 IN | BODY MASS INDEX: 34.53 KG/M2 | WEIGHT: 220 LBS

## 2025-03-20 RX ORDER — AMLODIPINE BESYLATE 5 MG/1
5 TABLET ORAL DAILY
COMMUNITY

## 2025-03-20 RX ORDER — ERGOCALCIFEROL 1.25 MG/1
50000 CAPSULE, LIQUID FILLED ORAL WEEKLY
COMMUNITY

## 2025-03-20 NOTE — PRE-PROCEDURE INSTRUCTIONS
ARRIVE AT THE HOSPITAL ON Wednesday, April 2,2025 at 12:30 PM    Once you enter the hospital lobby, take the elevators to the second floor.  Check-In is at the surgery registration desk.      Continue to take your home medications as you normally do up to and including the night before surgery with the exception of any blood thinning medications.    Please stop any blood thinning medications as directed by your surgeon or prescribing physician. Failure to stop certain medications may interfere with your scheduled surgery.    These may include:  Aspirin, Warfarin (Coumadin), Clopidogrel (Plavix), Ibuprofen (Motrin, Advil), Naproxen (Aleve), Meloxicam (Mobic), Celecoxib (Celebrex), Eliquis, Pradaxa, Xarelto, Effient, Fish Oil, Herbal supplements.            Please take the following medication(s) the day of surgery with a small sip of water:  Amlodipine,metoprolol,omeprazole    Please use your inhaler(s) if needed and bring your inhaler(s) from home the day of surgery      PREPARING FOR YOUR SURGERY:     Before surgery, you can play an important role in your own health. Because skin is not sterile, we need to be sure that your skin is as free of germs as possible before surgery by carefully washing before surgery.  Preparing or “prepping” skin before surgery can reduce the risk of a “surgical site infection.”  Do not shave the area of your body where your surgery will be performed unless you received specific permission from your physician.    Preoperative Bathing Instructions  Bathe or shower the day of surgery.  Wear clean clothing after bathing.  Shampoo hair before surgery  Keep nails clean   Do not apply alcohol-based hair or skin products,   Do not apply lotion, emollients, cosmetics, perfumes or deodorant the day of surgery.  Do not shave the area of your body where your surgery will be performed unless you receive specific permission from your surgeon.       Patient Instructions:    If you are having any

## 2025-03-21 ENCOUNTER — HOSPITAL ENCOUNTER (OUTPATIENT)
Dept: PREADMISSION TESTING | Age: 55
Discharge: HOME | End: 2025-03-25

## 2025-04-01 ENCOUNTER — ANESTHESIA EVENT (OUTPATIENT)
Dept: OPERATING ROOM | Age: 55
End: 2025-04-01
Payer: MEDICAID

## 2025-04-02 ENCOUNTER — APPOINTMENT (OUTPATIENT)
Dept: GENERAL RADIOLOGY | Age: 55
End: 2025-04-02
Attending: UROLOGY
Payer: MEDICAID

## 2025-04-02 ENCOUNTER — HOSPITAL ENCOUNTER (OUTPATIENT)
Age: 55
Setting detail: OUTPATIENT SURGERY
Discharge: HOME | End: 2025-04-02
Attending: UROLOGY | Admitting: UROLOGY
Payer: MEDICAID

## 2025-04-02 ENCOUNTER — ANESTHESIA (OUTPATIENT)
Dept: OPERATING ROOM | Age: 55
End: 2025-04-02
Payer: MEDICAID

## 2025-04-02 VITALS
OXYGEN SATURATION: 95 % | BODY MASS INDEX: 35.47 KG/M2 | HEIGHT: 67 IN | WEIGHT: 226 LBS | DIASTOLIC BLOOD PRESSURE: 75 MMHG | SYSTOLIC BLOOD PRESSURE: 131 MMHG | HEART RATE: 58 BPM | RESPIRATION RATE: 15 BRPM | TEMPERATURE: 97.7 F

## 2025-04-02 LAB
ANION GAP SERPL CALCULATED.3IONS-SCNC: 13 MMOL/L (ref 9–16)
BUN SERPL-MCNC: 12 MG/DL (ref 6–20)
CALCIUM SERPL-MCNC: 9.5 MG/DL (ref 8.6–10.4)
CHLORIDE SERPL-SCNC: 106 MMOL/L (ref 98–107)
CO2 SERPL-SCNC: 23 MMOL/L (ref 20–31)
CREAT SERPL-MCNC: 0.7 MG/DL (ref 0.5–0.9)
ERYTHROCYTE [DISTWIDTH] IN BLOOD BY AUTOMATED COUNT: 12 % (ref 11.8–14.4)
GFR, ESTIMATED: >90 ML/MIN/1.73M2
GLUCOSE SERPL-MCNC: 105 MG/DL (ref 74–99)
HCG, PREGNANCY URINE (POC): NEGATIVE
HCT VFR BLD AUTO: 43.9 % (ref 36.3–47.1)
HGB BLD-MCNC: 15.8 G/DL (ref 11.9–15.1)
MCH RBC QN AUTO: 32.2 PG (ref 25.2–33.5)
MCHC RBC AUTO-ENTMCNC: 36 G/DL (ref 28.4–34.8)
MCV RBC AUTO: 89.4 FL (ref 82.6–102.9)
NRBC BLD-RTO: 0 PER 100 WBC
PLATELET # BLD AUTO: 231 K/UL (ref 138–453)
PMV BLD AUTO: 10.8 FL (ref 8.1–13.5)
POTASSIUM SERPL-SCNC: 4.1 MMOL/L (ref 3.7–5.3)
RBC # BLD AUTO: 4.91 M/UL (ref 3.95–5.11)
SODIUM SERPL-SCNC: 142 MMOL/L (ref 136–145)
WBC OTHER # BLD: 5.7 K/UL (ref 3.5–11.3)

## 2025-04-02 PROCEDURE — 7100000011 HC PHASE II RECOVERY - ADDTL 15 MIN: Performed by: UROLOGY

## 2025-04-02 PROCEDURE — 7100000010 HC PHASE II RECOVERY - FIRST 15 MIN: Performed by: UROLOGY

## 2025-04-02 PROCEDURE — 85027 COMPLETE CBC AUTOMATED: CPT

## 2025-04-02 PROCEDURE — 2580000003 HC RX 258: Performed by: ANESTHESIOLOGY

## 2025-04-02 PROCEDURE — 3700000001 HC ADD 15 MINUTES (ANESTHESIA): Performed by: UROLOGY

## 2025-04-02 PROCEDURE — 80048 BASIC METABOLIC PNL TOTAL CA: CPT

## 2025-04-02 PROCEDURE — 7100000000 HC PACU RECOVERY - FIRST 15 MIN: Performed by: UROLOGY

## 2025-04-02 PROCEDURE — 74018 RADEX ABDOMEN 1 VIEW: CPT

## 2025-04-02 PROCEDURE — 6360000002 HC RX W HCPCS: Performed by: NURSE ANESTHETIST, CERTIFIED REGISTERED

## 2025-04-02 PROCEDURE — 3600000002 HC SURGERY LEVEL 2 BASE: Performed by: UROLOGY

## 2025-04-02 PROCEDURE — 3700000000 HC ANESTHESIA ATTENDED CARE: Performed by: UROLOGY

## 2025-04-02 PROCEDURE — 6370000000 HC RX 637 (ALT 250 FOR IP): Performed by: ANESTHESIOLOGY

## 2025-04-02 PROCEDURE — 6360000002 HC RX W HCPCS: Performed by: ANESTHESIOLOGY

## 2025-04-02 PROCEDURE — 81025 URINE PREGNANCY TEST: CPT

## 2025-04-02 PROCEDURE — 7100000001 HC PACU RECOVERY - ADDTL 15 MIN: Performed by: UROLOGY

## 2025-04-02 PROCEDURE — 3600000012 HC SURGERY LEVEL 2 ADDTL 15MIN: Performed by: UROLOGY

## 2025-04-02 RX ORDER — PROCHLORPERAZINE EDISYLATE 5 MG/ML
5 INJECTION INTRAMUSCULAR; INTRAVENOUS
Status: DISCONTINUED | OUTPATIENT
Start: 2025-04-02 | End: 2025-04-02 | Stop reason: HOSPADM

## 2025-04-02 RX ORDER — SODIUM CHLORIDE 9 MG/ML
INJECTION, SOLUTION INTRAVENOUS PRN
Status: DISCONTINUED | OUTPATIENT
Start: 2025-04-02 | End: 2025-04-02 | Stop reason: HOSPADM

## 2025-04-02 RX ORDER — FENTANYL CITRATE 50 UG/ML
50 INJECTION, SOLUTION INTRAMUSCULAR; INTRAVENOUS EVERY 5 MIN PRN
Status: DISCONTINUED | OUTPATIENT
Start: 2025-04-02 | End: 2025-04-02 | Stop reason: HOSPADM

## 2025-04-02 RX ORDER — PHENYLEPHRINE HCL IN 0.9% NACL 1 MG/10 ML
SYRINGE (ML) INTRAVENOUS
Status: DISCONTINUED | OUTPATIENT
Start: 2025-04-02 | End: 2025-04-02 | Stop reason: SDUPTHER

## 2025-04-02 RX ORDER — ONDANSETRON 2 MG/ML
4 INJECTION INTRAMUSCULAR; INTRAVENOUS
Status: DISCONTINUED | OUTPATIENT
Start: 2025-04-02 | End: 2025-04-02 | Stop reason: HOSPADM

## 2025-04-02 RX ORDER — KETOROLAC TROMETHAMINE 30 MG/ML
15 INJECTION, SOLUTION INTRAMUSCULAR; INTRAVENOUS ONCE
Status: COMPLETED | OUTPATIENT
Start: 2025-04-02 | End: 2025-04-02

## 2025-04-02 RX ORDER — SODIUM CHLORIDE 0.9 % (FLUSH) 0.9 %
5-40 SYRINGE (ML) INJECTION EVERY 12 HOURS SCHEDULED
Status: DISCONTINUED | OUTPATIENT
Start: 2025-04-02 | End: 2025-04-02 | Stop reason: HOSPADM

## 2025-04-02 RX ORDER — SODIUM CHLORIDE, SODIUM LACTATE, POTASSIUM CHLORIDE, CALCIUM CHLORIDE 600; 310; 30; 20 MG/100ML; MG/100ML; MG/100ML; MG/100ML
INJECTION, SOLUTION INTRAVENOUS CONTINUOUS
Status: DISCONTINUED | OUTPATIENT
Start: 2025-04-02 | End: 2025-04-02 | Stop reason: HOSPADM

## 2025-04-02 RX ORDER — KETOROLAC TROMETHAMINE 10 MG/1
10 TABLET, FILM COATED ORAL EVERY 6 HOURS PRN
Qty: 10 TABLET | Refills: 0 | Status: SHIPPED | OUTPATIENT
Start: 2025-04-02 | End: 2025-04-05

## 2025-04-02 RX ORDER — SODIUM CHLORIDE 9 MG/ML
INJECTION, SOLUTION INTRAVENOUS CONTINUOUS
Status: DISCONTINUED | OUTPATIENT
Start: 2025-04-02 | End: 2025-04-02 | Stop reason: HOSPADM

## 2025-04-02 RX ORDER — LIDOCAINE HYDROCHLORIDE 10 MG/ML
1 INJECTION, SOLUTION EPIDURAL; INFILTRATION; INTRACAUDAL; PERINEURAL
Status: DISCONTINUED | OUTPATIENT
Start: 2025-04-03 | End: 2025-04-02 | Stop reason: HOSPADM

## 2025-04-02 RX ORDER — PROPOFOL 10 MG/ML
INJECTION, EMULSION INTRAVENOUS
Status: DISCONTINUED | OUTPATIENT
Start: 2025-04-02 | End: 2025-04-02 | Stop reason: SDUPTHER

## 2025-04-02 RX ORDER — FENTANYL CITRATE 50 UG/ML
INJECTION, SOLUTION INTRAMUSCULAR; INTRAVENOUS
Status: DISCONTINUED | OUTPATIENT
Start: 2025-04-02 | End: 2025-04-02 | Stop reason: SDUPTHER

## 2025-04-02 RX ORDER — SODIUM CHLORIDE 0.9 % (FLUSH) 0.9 %
5-40 SYRINGE (ML) INJECTION PRN
Status: DISCONTINUED | OUTPATIENT
Start: 2025-04-02 | End: 2025-04-02 | Stop reason: HOSPADM

## 2025-04-02 RX ORDER — LIDOCAINE HYDROCHLORIDE 20 MG/ML
INJECTION, SOLUTION EPIDURAL; INFILTRATION; INTRACAUDAL; PERINEURAL
Status: DISCONTINUED | OUTPATIENT
Start: 2025-04-02 | End: 2025-04-02 | Stop reason: SDUPTHER

## 2025-04-02 RX ORDER — MIDAZOLAM HYDROCHLORIDE 1 MG/ML
INJECTION, SOLUTION INTRAMUSCULAR; INTRAVENOUS
Status: DISCONTINUED | OUTPATIENT
Start: 2025-04-02 | End: 2025-04-02 | Stop reason: SDUPTHER

## 2025-04-02 RX ORDER — FENTANYL CITRATE 50 UG/ML
25 INJECTION, SOLUTION INTRAMUSCULAR; INTRAVENOUS EVERY 5 MIN PRN
Status: DISCONTINUED | OUTPATIENT
Start: 2025-04-02 | End: 2025-04-02 | Stop reason: HOSPADM

## 2025-04-02 RX ORDER — DEXAMETHASONE SODIUM PHOSPHATE 10 MG/ML
INJECTION, SOLUTION INTRAMUSCULAR; INTRAVENOUS
Status: DISCONTINUED | OUTPATIENT
Start: 2025-04-02 | End: 2025-04-02 | Stop reason: SDUPTHER

## 2025-04-02 RX ORDER — PROMETHAZINE HYDROCHLORIDE 12.5 MG/1
12.5 TABLET ORAL ONCE
Status: COMPLETED | OUTPATIENT
Start: 2025-04-02 | End: 2025-04-02

## 2025-04-02 RX ORDER — ONDANSETRON 2 MG/ML
INJECTION INTRAMUSCULAR; INTRAVENOUS
Status: DISCONTINUED | OUTPATIENT
Start: 2025-04-02 | End: 2025-04-02 | Stop reason: SDUPTHER

## 2025-04-02 RX ADMIN — PROPOFOL 120 MCG/KG/MIN: 10 INJECTION, EMULSION INTRAVENOUS at 15:00

## 2025-04-02 RX ADMIN — MIDAZOLAM 2 MG: 1 INJECTION INTRAMUSCULAR; INTRAVENOUS at 14:46

## 2025-04-02 RX ADMIN — LIDOCAINE HYDROCHLORIDE 80 MG: 20 INJECTION, SOLUTION EPIDURAL; INFILTRATION; INTRACAUDAL; PERINEURAL at 14:54

## 2025-04-02 RX ADMIN — PROPOFOL 200 MG: 10 INJECTION, EMULSION INTRAVENOUS at 14:54

## 2025-04-02 RX ADMIN — Medication 100 MCG: at 15:07

## 2025-04-02 RX ADMIN — ONDANSETRON 4 MG: 2 INJECTION, SOLUTION INTRAMUSCULAR; INTRAVENOUS at 15:51

## 2025-04-02 RX ADMIN — DEXAMETHASONE SODIUM PHOSPHATE 10 MG: 10 INJECTION, SOLUTION INTRAMUSCULAR; INTRAVENOUS at 14:56

## 2025-04-02 RX ADMIN — SODIUM CHLORIDE, SODIUM LACTATE, POTASSIUM CHLORIDE, AND CALCIUM CHLORIDE: .6; .31; .03; .02 INJECTION, SOLUTION INTRAVENOUS at 13:45

## 2025-04-02 RX ADMIN — KETOROLAC TROMETHAMINE 15 MG: 30 INJECTION, SOLUTION INTRAMUSCULAR at 16:49

## 2025-04-02 RX ADMIN — FENTANYL CITRATE 100 MCG: 50 INJECTION INTRAMUSCULAR; INTRAVENOUS at 14:54

## 2025-04-02 RX ADMIN — PROMETHAZINE HYDROCHLORIDE 12.5 MG: 12.5 TABLET ORAL at 13:51

## 2025-04-02 ASSESSMENT — PAIN - FUNCTIONAL ASSESSMENT: PAIN_FUNCTIONAL_ASSESSMENT: 0-10

## 2025-04-02 ASSESSMENT — ENCOUNTER SYMPTOMS: SHORTNESS OF BREATH: 0

## 2025-04-02 ASSESSMENT — PAIN DESCRIPTION - DESCRIPTORS: DESCRIPTORS: ACHING

## 2025-04-02 NOTE — H&P
History and Physical Service   [facility]    HISTORY AND PHYSICAL EXAMINATION            Date of Evaluation: 2025  Patient name:  Kaylee Stewart  MRN:   [id number]  YOB: 1970  PCP:    Cristian Adams MD    History Obtained From:     Patient, medical records    History of Present Illness:     This is Kaylee Stewart a 54 y.o. female who presents today for a BILATERAL  EXTRACORPOREAL SHOCK WAVE LITHOTRIPSY by Rafael Lewis MD for Renal calculus, bilateral. Patient has a history of multiple kidney stones. She has had lithotripsies in the past and states she is then normally able to pass the stones at home with Tramadol, Toradol, and Zofran. Today, she reports bilateral flank pain with the left being worse than the right and foul smelling odor to her urine. States she has multiple bilateral kidney stones. KUB completed and in process. Denies dysuria, hematuria, frequency, urgency, or incontinence. Denies fever, chills, cough, congestion, wheezing, chest pain, open sores or wounds. States she is insulin resistant, but is not on any medications. Denies any current blood thinning medications.     Past Medical History:     Past Medical History:   Diagnosis Date    Anxiety     Depression     Diabetes mellitus (HCC)     insulin resistance,not on meds    Fibromyalgia     History of hyperparathyroidism     Hypertension     Migraine     PONV (postoperative nausea and vomiting)     Prolonged emergence from general anesthesia         Past Surgical History:     Past Surgical History:   Procedure Laterality Date     SECTION      CHOLECYSTECTOMY      COCCYX REMOVAL      LITHOTRIPSY      SEPTOPLASTY      TEAR DUCT SURGERY Left     x2    TONSILLECTOMY      TUMOR REMOVAL      left posterior neck- benign    URETER STENT PLACEMENT      WISDOM TOOTH EXTRACTION          Medications Prior to Admission:     Prior to Admission medications    Medication Sig Start Date End Date

## 2025-04-02 NOTE — ANESTHESIA PRE PROCEDURE
Department of Anesthesiology  Preprocedure Note       Name:  Kaylee Stewart   Age:  54 y.o.  :  1970                                          MRN:  [id number]         Date:  2025      Surgeon: Surgeon(s):  Rafael Lewis MD    Procedure: Procedure(s):  BILATERAL  EXTRACORPOREAL SHOCK WAVE LITHOTRIPSY    Medications prior to admission:   Prior to Admission medications    Medication Sig Start Date End Date Taking? Authorizing Provider   amLODIPine (NORVASC) 5 MG tablet Take 1 tablet by mouth daily   Yes Lauren Rose MD   vitamin D (ERGOCALCIFEROL) 1.25 MG (38684 UT) CAPS capsule Take 1 capsule by mouth once a week   Yes Lauren Rose MD   BENZOYL PEROXIDE 10 % external wash Apply topically nightly Prn 24  Yes Lauren Rose MD   triamcinolone (ARISTOCORT) 0.5 % ointment 1 Application 3/11/24  Yes Lauren Rose MD   VEOZAH 45 MG TABS Take 45 mg by mouth daily 23  Yes Lauren Rose MD   hydrocortisone 2.5 % cream Apply to the rectum and perineum twice daily as needed for irritation and hemorrhoids. 24  Yes Lauren Rose MD   lidocaine (XYLOCAINE) 5 % ointment Apply 1 Application topically daily as needed 24  Yes Lauren Rose MD   Urea (CARMOL) 40 % cream Apply to affected area once daily 25  Yes Sveta Gomez DPM   ondansetron (ZOFRAN) 4 MG tablet Take 1 tablet by mouth every 8 hours as needed for Nausea or Vomiting 25  Yes Kathrine Street APRN - CNP   tiZANidine (ZANAFLEX) 4 MG tablet Take 1 tablet by mouth every 8 hours as needed (pain, muscle spasms) 25  Yes Kathrine Street APRN - CNP   metoprolol tartrate (LOPRESSOR) 25 MG tablet take 1 tablet by mouth twice a day 24  Yes Jessika Whitlock MD   progesterone (PROMETRIUM) 100 MG CAPS capsule Take 1 capsule by mouth 10/14/23  Yes Lauren Rose MD   rOPINIRole (REQUIP) 2 MG tablet take 1 tablet by mouth nightly 3/27/20  Yes Lelo Rayo MD

## 2025-04-02 NOTE — OP NOTE
Operative Note      Patient: Kaylee Stewart  YOB: 1970  MRN: [id number]    Date of Procedure: 4/2/2025    Pre-Op Diagnosis Codes:      * Renal calculus, bilateral [N20.0]    Post-Op Diagnosis: Same       Procedure(s):  BILATERAL  EXTRACORPOREAL SHOCK WAVE LITHOTRIPSY    Surgeon(s):  Rafael Bryan MD    Assistant:   * No surgical staff found *    Anesthesia: General    Estimated Blood Loss (mL): Minimal    Complications: None    Specimens:   * No specimens in log *    Implants:  * No implants in log *      Drains: * No LDAs found *    Findings:  Infection Present At Time Of Surgery (PATOS) (choose all levels that have infection present):  No infection present  Other Findings: Bilateral kidney stones    Detailed Description of Procedure:   She was given general anesthesia and put in supine position.  The left sided kidney stone was treated first.  It was about a centimeter near the UPJ.  We delivered 3000 shocks at it using a power level of 7.  There were other stones in the left kidney that we did not address.    Right sided kidney stone was identified in the midpole 3000 shocks at a power level of 7 were directed at it which fragmented well.  She will follow-up in 1 month.    Electronically signed by Rafael BRYAN MD on 4/2/2025 at 4:15 PM

## 2025-04-02 NOTE — ANESTHESIA POSTPROCEDURE EVALUATION
Department of Anesthesiology  Postprocedure Note    Patient: Kaylee Stewart  MRN: [id number]  YOB: 1970  Date of evaluation: 4/2/2025    Procedure Summary       Date: 04/02/25 Room / Location: [address]    Anesthesia Start: 1447 Anesthesia Stop: 1634    Procedure: BILATERAL  EXTRACORPOREAL SHOCK WAVE LITHOTRIPSY (Bilateral: Flank) Diagnosis:       Renal calculus, bilateral      (Renal calculus, bilateral [N20.0])    Surgeons: Rafael Lewis MD Responsible Provider: Niki Dumont MD    Anesthesia Type: general ASA Status: 3            Anesthesia Type: No value filed.    Martin Phase I: Martin Score: 9    Martin Phase II:      Anesthesia Post Evaluation    Patient location during evaluation: PACU  Patient participation: complete - patient participated  Level of consciousness: awake  Airway patency: patent  Nausea & Vomiting: no nausea  Cardiovascular status: blood pressure returned to baseline  Respiratory status: acceptable  Hydration status: euvolemic  Comments: Multimodal analgesia pain management as indicated by procedure  Multimodal analgesia pain management approach  Pain management: adequate    No notable events documented.

## 2025-04-04 LAB
EKG ATRIAL RATE: 63 BPM
EKG P AXIS: 6 DEGREES
EKG P-R INTERVAL: 152 MS
EKG Q-T INTERVAL: 412 MS
EKG QRS DURATION: 86 MS
EKG QTC CALCULATION (BAZETT): 421 MS
EKG R AXIS: 2 DEGREES
EKG T AXIS: 23 DEGREES
EKG VENTRICULAR RATE: 63 BPM

## 2025-05-23 ENCOUNTER — HOSPITAL ENCOUNTER (OUTPATIENT)
Dept: PREADMISSION TESTING | Age: 55
Discharge: HOME | End: 2025-05-27

## 2025-05-23 VITALS — BODY MASS INDEX: 35.47 KG/M2 | WEIGHT: 226 LBS | HEIGHT: 67 IN

## 2025-05-23 NOTE — PRE-PROCEDURE INSTRUCTIONS
PAT Phone Interview Instructions     ARRIVE AT [facility] ON Wednesday, 6/5/25 at 1:00 PM    Once you enter the hospital lobby, take the elevators to the second floor.  Check-In is at the surgery registration desk.      Continue to take your home medications as you normally do up to and including the night before surgery with the exception of any blood thinning medications.      Blood Thinning Medications:  Please stop prescription blood thinning medications such as Apixaban (Eliquis); Clopidogrel (Plavix); Dabigatran (Pradaxa); Prasugrel (Effient); Rivaroxaban (Xarelto); Ticagrelor (Brilinta); Warfarin (Coumadin) only as directed by your surgeon and/or the prescribing physician    Some common examples of other medications that can thin your blood are: Aspirin, Ibuprofen (Advil, Motrin), Naproxen (Aleve), Meloxicam (Mobic), Celecoxib (Celebrex), Fish Oil, many Herbal Supplements.  These medications should usually be stopped at least 7 days prior to surgery.        Tylenol is OK to take for pain the week prior to surgery.    Failure to stop certain medications may interfere with your scheduled surgery.    If you receive instructions from your surgeon regarding what medications to stop prior to surgery, please follow those specific instructions.      Please take the following medication(s) the day of surgery with a small sip of water:  Amlodipine, metoprolol, omeprazole, duloxetine.    If you are on medicare and there is a possibility that you will be admitted following surgery:   Please bring your prescription medications in their original bottles with pharmacy label on the day of surgery.    Please use your inhaler(s) if needed and bring your inhaler(s) from home the day of surgery      PREPARING FOR YOUR SURGERY:     Before surgery, you can play an important role in your own health. Because skin is not sterile, we need to be sure that your skin is as free of germs as possible before surgery by carefully